# Patient Record
Sex: FEMALE | Race: WHITE | NOT HISPANIC OR LATINO | Employment: OTHER | URBAN - METROPOLITAN AREA
[De-identification: names, ages, dates, MRNs, and addresses within clinical notes are randomized per-mention and may not be internally consistent; named-entity substitution may affect disease eponyms.]

---

## 2023-02-18 ENCOUNTER — APPOINTMENT (OUTPATIENT)
Dept: GENERAL RADIOLOGY | Facility: HOSPITAL | Age: 63
End: 2023-02-18
Payer: MEDICARE

## 2023-02-18 ENCOUNTER — HOSPITAL ENCOUNTER (OUTPATIENT)
Facility: HOSPITAL | Age: 63
Setting detail: OBSERVATION
Discharge: HOME | End: 2023-02-20
Attending: EMERGENCY MEDICINE | Admitting: INTERNAL MEDICINE
Payer: MEDICARE

## 2023-02-18 DIAGNOSIS — R06.03 RESPIRATORY DISTRESS: ICD-10-CM

## 2023-02-18 DIAGNOSIS — B34.8 RHINOVIRUS INFECTION: ICD-10-CM

## 2023-02-18 DIAGNOSIS — R09.02 HYPOXIA: ICD-10-CM

## 2023-02-18 DIAGNOSIS — J45.51 SEVERE PERSISTENT ASTHMA WITH ACUTE EXACERBATION: Primary | ICD-10-CM

## 2023-02-18 PROBLEM — G25.0 ESSENTIAL TREMOR: Status: ACTIVE | Noted: 2023-02-18

## 2023-02-18 PROBLEM — F32.A ANXIETY AND DEPRESSION: Status: ACTIVE | Noted: 2023-02-18

## 2023-02-18 PROBLEM — B33.8 RSV (RESPIRATORY SYNCYTIAL VIRUS INFECTION): Status: ACTIVE | Noted: 2023-02-18

## 2023-02-18 PROBLEM — R79.89 ELEVATED TROPONIN: Status: ACTIVE | Noted: 2023-02-18

## 2023-02-18 PROBLEM — R77.8 ELEVATED TROPONIN: Status: ACTIVE | Noted: 2023-02-18

## 2023-02-18 PROBLEM — J96.01 ACUTE RESPIRATORY FAILURE WITH HYPOXIA: Status: ACTIVE | Noted: 2023-02-18

## 2023-02-18 PROBLEM — F41.9 ANXIETY AND DEPRESSION: Status: ACTIVE | Noted: 2023-02-18

## 2023-02-18 LAB
ALBUMIN SERPL-MCNC: 4.7 G/DL (ref 3.5–5.2)
ALBUMIN/GLOB SERPL: 1.7 G/DL
ALP SERPL-CCNC: 141 U/L (ref 39–117)
ALT SERPL W P-5'-P-CCNC: 21 U/L (ref 1–33)
ANION GAP SERPL CALCULATED.3IONS-SCNC: 13 MMOL/L (ref 5–15)
AST SERPL-CCNC: 17 U/L (ref 1–32)
B PARAPERT DNA SPEC QL NAA+PROBE: NOT DETECTED
B PERT DNA SPEC QL NAA+PROBE: NOT DETECTED
BASOPHILS # BLD AUTO: 0.04 10*3/MM3 (ref 0–0.2)
BASOPHILS NFR BLD AUTO: 0.4 % (ref 0–1.5)
BILIRUB SERPL-MCNC: 0.4 MG/DL (ref 0–1.2)
BUN SERPL-MCNC: 11 MG/DL (ref 8–23)
BUN/CREAT SERPL: 11.7 (ref 7–25)
C PNEUM DNA NPH QL NAA+NON-PROBE: NOT DETECTED
CALCIUM SPEC-SCNC: 9.3 MG/DL (ref 8.6–10.5)
CHLORIDE SERPL-SCNC: 101 MMOL/L (ref 98–107)
CO2 SERPL-SCNC: 23 MMOL/L (ref 22–29)
CREAT SERPL-MCNC: 0.94 MG/DL (ref 0.57–1)
D-LACTATE SERPL-SCNC: 1.3 MMOL/L (ref 0.5–2)
DEPRECATED RDW RBC AUTO: 50.6 FL (ref 37–54)
EGFRCR SERPLBLD CKD-EPI 2021: 69.6 ML/MIN/1.73
EOSINOPHIL # BLD AUTO: 0.19 10*3/MM3 (ref 0–0.4)
EOSINOPHIL NFR BLD AUTO: 1.7 % (ref 0.3–6.2)
ERYTHROCYTE [DISTWIDTH] IN BLOOD BY AUTOMATED COUNT: 14.6 % (ref 12.3–15.4)
FLUAV SUBTYP SPEC NAA+PROBE: NOT DETECTED
FLUBV RNA ISLT QL NAA+PROBE: NOT DETECTED
GEN 5 2HR TROPONIN T REFLEX: 10 NG/L
GLOBULIN UR ELPH-MCNC: 2.7 GM/DL
GLUCOSE SERPL-MCNC: 123 MG/DL (ref 65–99)
HADV DNA SPEC NAA+PROBE: NOT DETECTED
HCOV 229E RNA SPEC QL NAA+PROBE: NOT DETECTED
HCOV HKU1 RNA SPEC QL NAA+PROBE: NOT DETECTED
HCOV NL63 RNA SPEC QL NAA+PROBE: NOT DETECTED
HCOV OC43 RNA SPEC QL NAA+PROBE: NOT DETECTED
HCT VFR BLD AUTO: 41.2 % (ref 34–46.6)
HGB BLD-MCNC: 12.8 G/DL (ref 12–15.9)
HMPV RNA NPH QL NAA+NON-PROBE: NOT DETECTED
HOLD SPECIMEN: NORMAL
HPIV1 RNA ISLT QL NAA+PROBE: NOT DETECTED
HPIV2 RNA SPEC QL NAA+PROBE: NOT DETECTED
HPIV3 RNA NPH QL NAA+PROBE: NOT DETECTED
HPIV4 P GENE NPH QL NAA+PROBE: NOT DETECTED
IMM GRANULOCYTES # BLD AUTO: 0.04 10*3/MM3 (ref 0–0.05)
IMM GRANULOCYTES NFR BLD AUTO: 0.4 % (ref 0–0.5)
LYMPHOCYTES # BLD AUTO: 1.69 10*3/MM3 (ref 0.7–3.1)
LYMPHOCYTES NFR BLD AUTO: 15.3 % (ref 19.6–45.3)
M PNEUMO IGG SER IA-ACNC: NOT DETECTED
MCH RBC QN AUTO: 29.2 PG (ref 26.6–33)
MCHC RBC AUTO-ENTMCNC: 31.1 G/DL (ref 31.5–35.7)
MCV RBC AUTO: 93.8 FL (ref 79–97)
MONOCYTES # BLD AUTO: 0.86 10*3/MM3 (ref 0.1–0.9)
MONOCYTES NFR BLD AUTO: 7.8 % (ref 5–12)
NEUTROPHILS NFR BLD AUTO: 74.4 % (ref 42.7–76)
NEUTROPHILS NFR BLD AUTO: 8.23 10*3/MM3 (ref 1.7–7)
NRBC BLD AUTO-RTO: 0 /100 WBC (ref 0–0.2)
NT-PROBNP SERPL-MCNC: 114.9 PG/ML (ref 0–900)
PLATELET # BLD AUTO: 160 10*3/MM3 (ref 140–450)
PMV BLD AUTO: 12.4 FL (ref 6–12)
POTASSIUM SERPL-SCNC: 3.6 MMOL/L (ref 3.5–5.2)
PROCALCITONIN SERPL-MCNC: 0.06 NG/ML (ref 0–0.25)
PROT SERPL-MCNC: 7.4 G/DL (ref 6–8.5)
RBC # BLD AUTO: 4.39 10*6/MM3 (ref 3.77–5.28)
RHINOVIRUS RNA SPEC NAA+PROBE: DETECTED
RSV RNA NPH QL NAA+NON-PROBE: NOT DETECTED
SARS-COV-2 RNA NPH QL NAA+NON-PROBE: NOT DETECTED
SODIUM SERPL-SCNC: 137 MMOL/L (ref 136–145)
TROPONIN T DELTA: ABNORMAL
TROPONIN T SERPL HS-MCNC: 7 NG/L
TROPONIN T SERPL HS-MCNC: <6 NG/L
WBC NRBC COR # BLD: 11.05 10*3/MM3 (ref 3.4–10.8)
WHOLE BLOOD HOLD COAG: NORMAL
WHOLE BLOOD HOLD SPECIMEN: NORMAL

## 2023-02-18 PROCEDURE — 87040 BLOOD CULTURE FOR BACTERIA: CPT | Performed by: EMERGENCY MEDICINE

## 2023-02-18 PROCEDURE — 25010000002 HEPARIN (PORCINE) PER 1000 UNITS: Performed by: PHYSICIAN ASSISTANT

## 2023-02-18 PROCEDURE — 94799 UNLISTED PULMONARY SVC/PX: CPT

## 2023-02-18 PROCEDURE — 93005 ELECTROCARDIOGRAM TRACING: CPT | Performed by: EMERGENCY MEDICINE

## 2023-02-18 PROCEDURE — 84145 PROCALCITONIN (PCT): CPT | Performed by: PHYSICIAN ASSISTANT

## 2023-02-18 PROCEDURE — 0202U NFCT DS 22 TRGT SARS-COV-2: CPT | Performed by: EMERGENCY MEDICINE

## 2023-02-18 PROCEDURE — 36415 COLL VENOUS BLD VENIPUNCTURE: CPT

## 2023-02-18 PROCEDURE — 83605 ASSAY OF LACTIC ACID: CPT | Performed by: EMERGENCY MEDICINE

## 2023-02-18 PROCEDURE — 80053 COMPREHEN METABOLIC PANEL: CPT | Performed by: EMERGENCY MEDICINE

## 2023-02-18 PROCEDURE — 71045 X-RAY EXAM CHEST 1 VIEW: CPT

## 2023-02-18 PROCEDURE — 93010 ELECTROCARDIOGRAM REPORT: CPT | Performed by: INTERNAL MEDICINE

## 2023-02-18 PROCEDURE — 83880 ASSAY OF NATRIURETIC PEPTIDE: CPT | Performed by: EMERGENCY MEDICINE

## 2023-02-18 PROCEDURE — G0378 HOSPITAL OBSERVATION PER HR: HCPCS

## 2023-02-18 PROCEDURE — 94640 AIRWAY INHALATION TREATMENT: CPT

## 2023-02-18 PROCEDURE — 99285 EMERGENCY DEPT VISIT HI MDM: CPT

## 2023-02-18 PROCEDURE — 99222 1ST HOSP IP/OBS MODERATE 55: CPT | Performed by: STUDENT IN AN ORGANIZED HEALTH CARE EDUCATION/TRAINING PROGRAM

## 2023-02-18 PROCEDURE — 84484 ASSAY OF TROPONIN QUANT: CPT | Performed by: EMERGENCY MEDICINE

## 2023-02-18 PROCEDURE — 85025 COMPLETE CBC W/AUTO DIFF WBC: CPT | Performed by: EMERGENCY MEDICINE

## 2023-02-18 PROCEDURE — 96372 THER/PROPH/DIAG INJ SC/IM: CPT

## 2023-02-18 PROCEDURE — 84484 ASSAY OF TROPONIN QUANT: CPT | Performed by: PHYSICIAN ASSISTANT

## 2023-02-18 RX ORDER — MONTELUKAST SODIUM 10 MG/1
1 TABLET ORAL DAILY
COMMUNITY
Start: 2022-12-09 | End: 2023-03-08 | Stop reason: SDUPTHER

## 2023-02-18 RX ORDER — TOPIRAMATE 100 MG/1
1 TABLET, FILM COATED ORAL EVERY 12 HOURS SCHEDULED
COMMUNITY
Start: 2023-01-05

## 2023-02-18 RX ORDER — LAMOTRIGINE 100 MG/1
TABLET ORAL
COMMUNITY
Start: 2023-02-17

## 2023-02-18 RX ORDER — PROPRANOLOL HYDROCHLORIDE 10 MG/1
20 TABLET ORAL EVERY 12 HOURS SCHEDULED
Status: DISCONTINUED | OUTPATIENT
Start: 2023-02-18 | End: 2023-02-20 | Stop reason: HOSPADM

## 2023-02-18 RX ORDER — SODIUM CHLORIDE 9 MG/ML
40 INJECTION, SOLUTION INTRAVENOUS AS NEEDED
Status: DISCONTINUED | OUTPATIENT
Start: 2023-02-18 | End: 2023-02-20 | Stop reason: HOSPADM

## 2023-02-18 RX ORDER — ONDANSETRON 2 MG/ML
4 INJECTION INTRAMUSCULAR; INTRAVENOUS EVERY 6 HOURS PRN
Status: DISCONTINUED | OUTPATIENT
Start: 2023-02-18 | End: 2023-02-20 | Stop reason: HOSPADM

## 2023-02-18 RX ORDER — MONTELUKAST SODIUM 10 MG/1
10 TABLET ORAL DAILY
Status: DISCONTINUED | OUTPATIENT
Start: 2023-02-19 | End: 2023-02-20 | Stop reason: HOSPADM

## 2023-02-18 RX ORDER — PROPRANOLOL HYDROCHLORIDE 10 MG/1
2 TABLET ORAL EVERY 12 HOURS SCHEDULED
COMMUNITY
Start: 2022-12-14

## 2023-02-18 RX ORDER — FLUOXETINE HYDROCHLORIDE 20 MG/1
40 CAPSULE ORAL DAILY
Status: DISCONTINUED | OUTPATIENT
Start: 2023-02-19 | End: 2023-02-20 | Stop reason: HOSPADM

## 2023-02-18 RX ORDER — IPRATROPIUM BROMIDE AND ALBUTEROL SULFATE 2.5; .5 MG/3ML; MG/3ML
3 SOLUTION RESPIRATORY (INHALATION) ONCE
Status: COMPLETED | OUTPATIENT
Start: 2023-02-18 | End: 2023-02-18

## 2023-02-18 RX ORDER — SODIUM CHLORIDE 0.9 % (FLUSH) 0.9 %
10 SYRINGE (ML) INJECTION AS NEEDED
Status: DISCONTINUED | OUTPATIENT
Start: 2023-02-18 | End: 2023-02-20 | Stop reason: HOSPADM

## 2023-02-18 RX ORDER — FLUOXETINE HYDROCHLORIDE 40 MG/1
CAPSULE ORAL
COMMUNITY
Start: 2023-01-05

## 2023-02-18 RX ORDER — HEPARIN SODIUM 5000 [USP'U]/ML
5000 INJECTION, SOLUTION INTRAVENOUS; SUBCUTANEOUS EVERY 12 HOURS SCHEDULED
Status: DISCONTINUED | OUTPATIENT
Start: 2023-02-18 | End: 2023-02-20 | Stop reason: HOSPADM

## 2023-02-18 RX ORDER — TOPIRAMATE 100 MG/1
100 TABLET, FILM COATED ORAL EVERY 12 HOURS SCHEDULED
Status: DISCONTINUED | OUTPATIENT
Start: 2023-02-18 | End: 2023-02-20 | Stop reason: HOSPADM

## 2023-02-18 RX ORDER — IPRATROPIUM BROMIDE AND ALBUTEROL SULFATE 2.5; .5 MG/3ML; MG/3ML
3 SOLUTION RESPIRATORY (INHALATION)
Status: DISCONTINUED | OUTPATIENT
Start: 2023-02-18 | End: 2023-02-20 | Stop reason: HOSPADM

## 2023-02-18 RX ORDER — IPRATROPIUM BROMIDE AND ALBUTEROL SULFATE 2.5; .5 MG/3ML; MG/3ML
3 SOLUTION RESPIRATORY (INHALATION) EVERY 6 HOURS PRN
Status: DISCONTINUED | OUTPATIENT
Start: 2023-02-18 | End: 2023-02-20 | Stop reason: HOSPADM

## 2023-02-18 RX ORDER — FLUOXETINE HYDROCHLORIDE 20 MG/1
CAPSULE ORAL
COMMUNITY
Start: 2023-01-28

## 2023-02-18 RX ORDER — CHOLECALCIFEROL (VITAMIN D3) 125 MCG
5 CAPSULE ORAL NIGHTLY PRN
Status: DISCONTINUED | OUTPATIENT
Start: 2023-02-18 | End: 2023-02-20 | Stop reason: HOSPADM

## 2023-02-18 RX ORDER — ALBUTEROL SULFATE 2.5 MG/3ML
10 SOLUTION RESPIRATORY (INHALATION)
Status: COMPLETED | OUTPATIENT
Start: 2023-02-18 | End: 2023-02-18

## 2023-02-18 RX ORDER — ACETAMINOPHEN 325 MG/1
650 TABLET ORAL EVERY 4 HOURS PRN
Status: DISCONTINUED | OUTPATIENT
Start: 2023-02-18 | End: 2023-02-20 | Stop reason: HOSPADM

## 2023-02-18 RX ORDER — OLANZAPINE 20 MG/1
1 TABLET ORAL DAILY
COMMUNITY
Start: 2022-12-10

## 2023-02-18 RX ORDER — SODIUM CHLORIDE 0.9 % (FLUSH) 0.9 %
10 SYRINGE (ML) INJECTION EVERY 12 HOURS SCHEDULED
Status: DISCONTINUED | OUTPATIENT
Start: 2023-02-18 | End: 2023-02-20 | Stop reason: HOSPADM

## 2023-02-18 RX ORDER — OLANZAPINE 5 MG/1
20 TABLET ORAL DAILY
Status: DISCONTINUED | OUTPATIENT
Start: 2023-02-19 | End: 2023-02-20 | Stop reason: HOSPADM

## 2023-02-18 RX ORDER — FLUOXETINE HYDROCHLORIDE 20 MG/1
20 CAPSULE ORAL DAILY
Status: DISCONTINUED | OUTPATIENT
Start: 2023-02-19 | End: 2023-02-20 | Stop reason: HOSPADM

## 2023-02-18 RX ADMIN — PROPRANOLOL HYDROCHLORIDE 20 MG: 10 TABLET ORAL at 22:07

## 2023-02-18 RX ADMIN — IPRATROPIUM BROMIDE AND ALBUTEROL SULFATE 3 ML: 2.5; .5 SOLUTION RESPIRATORY (INHALATION) at 16:43

## 2023-02-18 RX ADMIN — ACETAMINOPHEN 325MG 650 MG: 325 TABLET ORAL at 22:07

## 2023-02-18 RX ADMIN — TOPIRAMATE 100 MG: 100 TABLET, FILM COATED ORAL at 22:08

## 2023-02-18 RX ADMIN — ALBUTEROL SULFATE 10 MG: 2.5 SOLUTION RESPIRATORY (INHALATION) at 16:42

## 2023-02-18 RX ADMIN — Medication 10 ML: at 22:08

## 2023-02-18 RX ADMIN — HEPARIN SODIUM 5000 UNITS: 5000 INJECTION INTRAVENOUS; SUBCUTANEOUS at 22:08

## 2023-02-18 RX ADMIN — DOXYCYCLINE 100 MG: 100 INJECTION, POWDER, LYOPHILIZED, FOR SOLUTION INTRAVENOUS at 21:49

## 2023-02-18 RX ADMIN — IPRATROPIUM BROMIDE AND ALBUTEROL SULFATE 3 ML: 2.5; .5 SOLUTION RESPIRATORY (INHALATION) at 21:54

## 2023-02-18 NOTE — ED PROVIDER NOTES
Subjective   History of Present Illness  62-year-old female who presents for evaluation of shortness of breath.  The patient reports that she had a potential sick contact in the form of her granddaughter on Monday, 6 days ago.  Her granddaughter was diagnosed with strep pharyngitis.  Per the family's report the granddaughter self never really appeared that ill.  The patient herself does have a longstanding history of asthma.  She does not typically wear oxygen at home.  She does intermittently have to use breathing treatments and steroids at home.  She reports that secondary to steroid use she has got agoraphobia, but she still has to use it on a regular basis and essentially take Zyprexa in combination with the steroids to help minimize agoraphobia.  She reports that midweek, on Wednesday roughly 4 days ago she began to have increasing shortness of breath.  It was dramatically worse this morning.  The patient was identified to be hypoxic and tachycardic upon initial presentation.  With the application of oxygen the hypoxia has been corrected.  The patient is awake and alert with normal mentation.  She does report wheezing and external audible wheezing is heard from across the room.  She does report a mild cough that is mildly productive.  No reported fever.  She does report some chest tightness but no pain.  No abdominal pain.  No reported change in bowel or urinary function.  No other acute complaints.  No acute abdomen no does viewed, ET tube in atrial food seen by one of the septal 1 please get this warm doctor for your        Review of Systems   Constitutional: Positive for activity change, appetite change and fatigue. Negative for chills and fever.   HENT: Negative for congestion, ear pain, postnasal drip, sinus pressure and sore throat.    Eyes: Negative for pain, redness and visual disturbance.   Respiratory: Positive for cough and shortness of breath. Negative for chest tightness.    Cardiovascular:  Negative for chest pain, palpitations and leg swelling.   Gastrointestinal: Negative for abdominal pain, anal bleeding, blood in stool, diarrhea, nausea and vomiting.   Endocrine: Negative for polydipsia and polyuria.   Genitourinary: Negative for difficulty urinating, dysuria, frequency and urgency.   Musculoskeletal: Negative for arthralgias, back pain and neck pain.   Skin: Negative for pallor and rash.   Allergic/Immunologic: Negative for environmental allergies and immunocompromised state.   Neurological: Negative for dizziness, weakness and headaches.   Hematological: Negative for adenopathy.   Psychiatric/Behavioral: Negative for confusion, self-injury and suicidal ideas. The patient is nervous/anxious.    All other systems reviewed and are negative.      Past Medical History:   Diagnosis Date   • Asthma    • Mood disorder (HCC)        Allergies   Allergen Reactions   • Prednisone Delirium     PT STATED STEROID PSYCHOSIS       Past Surgical History:   Procedure Laterality Date   • CHOLECYSTECTOMY     • HYSTERECTOMY         History reviewed. No pertinent family history.    Social History     Socioeconomic History   • Marital status:    Tobacco Use   • Smoking status: Never     Passive exposure: Never   • Smokeless tobacco: Never   Vaping Use   • Vaping Use: Never used   Substance and Sexual Activity   • Alcohol use: Never   • Drug use: Never   • Sexual activity: Defer           Objective   Physical Exam  Vitals and nursing note reviewed.   Constitutional:       General: She is not in acute distress.     Appearance: Normal appearance. She is well-developed. She is not toxic-appearing or diaphoretic.   HENT:      Head: Normocephalic and atraumatic.      Right Ear: External ear normal.      Left Ear: External ear normal.      Nose: Nose normal.   Eyes:      General: Lids are normal.      Pupils: Pupils are equal, round, and reactive to light.   Neck:      Trachea: No tracheal deviation.   Cardiovascular:       Rate and Rhythm: Regular rhythm. Tachycardia present.      Pulses: No decreased pulses.      Heart sounds: Normal heart sounds. No murmur heard.    No friction rub. No gallop.   Pulmonary:      Effort: Tachypnea and accessory muscle usage present. No respiratory distress.      Breath sounds: Examination of the right-upper field reveals wheezing. Examination of the left-upper field reveals wheezing. Examination of the right-middle field reveals wheezing. Examination of the left-middle field reveals wheezing. Examination of the right-lower field reveals wheezing. Examination of the left-lower field reveals wheezing. Wheezing present. No decreased breath sounds, rhonchi or rales.   Abdominal:      General: Bowel sounds are normal.      Palpations: Abdomen is soft.      Tenderness: There is no abdominal tenderness. There is no guarding or rebound.   Musculoskeletal:         General: No deformity. Normal range of motion.      Cervical back: Normal range of motion and neck supple.   Lymphadenopathy:      Cervical: No cervical adenopathy.   Skin:     General: Skin is warm and dry.      Findings: No rash.   Neurological:      Mental Status: She is alert and oriented to person, place, and time.      Cranial Nerves: No cranial nerve deficit.      Sensory: No sensory deficit.   Psychiatric:         Speech: Speech normal.         Behavior: Behavior normal.         Thought Content: Thought content normal.         Judgment: Judgment normal.         Procedures           ED Course                                           Medical Decision Making  Differential diagnosis includes acute pneumonia, acute viral illness, acute asthma exacerbation, COPD exacerbation, CHF exacerbation.    The patient has diffuse wheezing on auscultation of the lungs.  His auscultation can be heard from across the room upon entering the room.    The patient is tachycardic tachypneic, and hypoxic upon initial evaluation.  She does not typically wear  oxygen.    Chest x-ray do not show an acute infection process.    Viral panel was positive for human rhinovirus.    Evaluation is really nonrevealing.    Patient was given a breathing treatment, did appear better, but continued to work hard to breathe.    I discussed the patient with the hospitalist.  The hospital service will consult on the patient to determine status of admission.        Hypoxia: acute illness or injury  Respiratory distress: acute illness or injury  Rhinovirus infection: acute illness or injury  Severe persistent asthma with acute exacerbation: acute illness or injury  Amount and/or Complexity of Data Reviewed  Labs: ordered.  Radiology: ordered.  ECG/medicine tests: ordered.      Risk  Prescription drug management.  Decision regarding hospitalization.          Final diagnoses:   Severe persistent asthma with acute exacerbation   Hypoxia   Respiratory distress   Rhinovirus infection       ED Disposition  ED Disposition     ED Disposition   Decision to Admit    Condition   --    Comment   Level of Care: Telemetry [5]   Diagnosis: Severe persistent asthma with acute exacerbation [640185]               Tiffany Kelley, APRN  [address] [address]  [phone]    Follow up  Call to schedule a follow-up appointment for 1 week.         Medication List      New Prescriptions    budesonide 0.5 MG/2ML nebulizer solution  Commonly known as: PULMICORT  Inhale 2 mL by nebulization 2 (Two) Times a Day for 5 days.     budesonide-formoterol 160-4.5 MCG/ACT inhaler  Commonly known as: Symbicort  Inhale 2 puffs 2 (Two) Times a Day.     doxycycline 100 MG capsule  Commonly known as: VIBRAMYCIN  Take 1 capsule by mouth 2 (Two) Times a Day.     predniSONE 10 MG tablet  Commonly known as: DELTASONE  Take 4 tablets by mouth Daily for 2 days, THEN 3 tablets Daily for 2 days, THEN 2 tablets Daily for 2 days, THEN 1 tablet Daily for 2 days.  Start taking on: February 20, 2023            Where to Get Your Medications      These medications were sent to [facility] - [address] [place] [address]    Hours: 7:00 AM-5:30 PM M-F, 8:00 AM-4:30 PM Sat-Sun Phone: [phone]   · budesonide 0.5 MG/2ML nebulizer solution  · doxycycline 100 MG capsule  · predniSONE 10 MG tablet     Information about where to get these medications is not yet available    Ask your nurse or doctor about these medications  · budesonide-formoterol 160-4.5 MCG/ACT inhaler          Rickey Mackenzie MD  02/21/23 2986

## 2023-02-19 LAB
ANION GAP SERPL CALCULATED.3IONS-SCNC: 10 MMOL/L (ref 5–15)
BUN SERPL-MCNC: 13 MG/DL (ref 8–23)
BUN/CREAT SERPL: 13.5 (ref 7–25)
CALCIUM SPEC-SCNC: 8.9 MG/DL (ref 8.6–10.5)
CHLORIDE SERPL-SCNC: 105 MMOL/L (ref 98–107)
CO2 SERPL-SCNC: 25 MMOL/L (ref 22–29)
CREAT SERPL-MCNC: 0.96 MG/DL (ref 0.57–1)
DEPRECATED RDW RBC AUTO: 51.1 FL (ref 37–54)
EGFRCR SERPLBLD CKD-EPI 2021: 67.9 ML/MIN/1.73
ERYTHROCYTE [DISTWIDTH] IN BLOOD BY AUTOMATED COUNT: 14.7 % (ref 12.3–15.4)
GLUCOSE SERPL-MCNC: 104 MG/DL (ref 65–99)
HBA1C MFR BLD: 5.1 % (ref 4.8–5.6)
HCT VFR BLD AUTO: 38.3 % (ref 34–46.6)
HGB BLD-MCNC: 12 G/DL (ref 12–15.9)
MCH RBC QN AUTO: 29.7 PG (ref 26.6–33)
MCHC RBC AUTO-ENTMCNC: 31.3 G/DL (ref 31.5–35.7)
MCV RBC AUTO: 94.8 FL (ref 79–97)
PLATELET # BLD AUTO: 133 10*3/MM3 (ref 140–450)
PMV BLD AUTO: 13.2 FL (ref 6–12)
POTASSIUM SERPL-SCNC: 3.7 MMOL/L (ref 3.5–5.2)
QT INTERVAL: 360 MS
QTC INTERVAL: 469 MS
RBC # BLD AUTO: 4.04 10*6/MM3 (ref 3.77–5.28)
SODIUM SERPL-SCNC: 140 MMOL/L (ref 136–145)
WBC NRBC COR # BLD: 6.05 10*3/MM3 (ref 3.4–10.8)

## 2023-02-19 PROCEDURE — G0378 HOSPITAL OBSERVATION PER HR: HCPCS

## 2023-02-19 PROCEDURE — 94799 UNLISTED PULMONARY SVC/PX: CPT

## 2023-02-19 PROCEDURE — 99232 SBSQ HOSP IP/OBS MODERATE 35: CPT | Performed by: INTERNAL MEDICINE

## 2023-02-19 PROCEDURE — 85027 COMPLETE CBC AUTOMATED: CPT | Performed by: PHYSICIAN ASSISTANT

## 2023-02-19 PROCEDURE — 63710000001 PREDNISONE PER 1 MG: Performed by: INTERNAL MEDICINE

## 2023-02-19 PROCEDURE — 94664 DEMO&/EVAL PT USE INHALER: CPT

## 2023-02-19 PROCEDURE — 83036 HEMOGLOBIN GLYCOSYLATED A1C: CPT | Performed by: PHYSICIAN ASSISTANT

## 2023-02-19 PROCEDURE — 96372 THER/PROPH/DIAG INJ SC/IM: CPT

## 2023-02-19 PROCEDURE — 25010000002 HEPARIN (PORCINE) PER 1000 UNITS: Performed by: PHYSICIAN ASSISTANT

## 2023-02-19 PROCEDURE — 80048 BASIC METABOLIC PNL TOTAL CA: CPT | Performed by: PHYSICIAN ASSISTANT

## 2023-02-19 RX ORDER — PREDNISONE 20 MG/1
40 TABLET ORAL
Status: DISCONTINUED | OUTPATIENT
Start: 2023-02-19 | End: 2023-02-20 | Stop reason: HOSPADM

## 2023-02-19 RX ORDER — BUDESONIDE 0.5 MG/2ML
0.5 INHALANT ORAL
Status: DISCONTINUED | OUTPATIENT
Start: 2023-02-19 | End: 2023-02-20 | Stop reason: HOSPADM

## 2023-02-19 RX ORDER — LAMOTRIGINE 100 MG/1
100 TABLET ORAL DAILY
Status: DISCONTINUED | OUTPATIENT
Start: 2023-02-19 | End: 2023-02-20 | Stop reason: HOSPADM

## 2023-02-19 RX ADMIN — HEPARIN SODIUM 5000 UNITS: 5000 INJECTION INTRAVENOUS; SUBCUTANEOUS at 08:15

## 2023-02-19 RX ADMIN — IPRATROPIUM BROMIDE AND ALBUTEROL SULFATE 3 ML: 2.5; .5 SOLUTION RESPIRATORY (INHALATION) at 20:02

## 2023-02-19 RX ADMIN — BUDESONIDE 0.5 MG: 0.5 SUSPENSION RESPIRATORY (INHALATION) at 20:02

## 2023-02-19 RX ADMIN — OLANZAPINE 20 MG: 5 TABLET, FILM COATED ORAL at 08:13

## 2023-02-19 RX ADMIN — PREDNISONE 40 MG: 20 TABLET ORAL at 12:26

## 2023-02-19 RX ADMIN — Medication 10 ML: at 21:18

## 2023-02-19 RX ADMIN — BUDESONIDE 0.5 MG: 0.5 SUSPENSION RESPIRATORY (INHALATION) at 10:49

## 2023-02-19 RX ADMIN — PROPRANOLOL HYDROCHLORIDE 20 MG: 10 TABLET ORAL at 21:15

## 2023-02-19 RX ADMIN — PROPRANOLOL HYDROCHLORIDE 20 MG: 10 TABLET ORAL at 08:14

## 2023-02-19 RX ADMIN — DOXYCYCLINE 100 MG: 100 INJECTION, POWDER, LYOPHILIZED, FOR SOLUTION INTRAVENOUS at 21:14

## 2023-02-19 RX ADMIN — IPRATROPIUM BROMIDE AND ALBUTEROL SULFATE 3 ML: 2.5; .5 SOLUTION RESPIRATORY (INHALATION) at 07:26

## 2023-02-19 RX ADMIN — Medication 10 ML: at 08:22

## 2023-02-19 RX ADMIN — MONTELUKAST 10 MG: 10 TABLET, FILM COATED ORAL at 08:14

## 2023-02-19 RX ADMIN — DOXYCYCLINE 100 MG: 100 INJECTION, POWDER, LYOPHILIZED, FOR SOLUTION INTRAVENOUS at 08:15

## 2023-02-19 RX ADMIN — HEPARIN SODIUM 5000 UNITS: 5000 INJECTION INTRAVENOUS; SUBCUTANEOUS at 21:15

## 2023-02-19 RX ADMIN — FLUOXETINE 20 MG: 20 CAPSULE ORAL at 08:16

## 2023-02-19 RX ADMIN — FLUOXETINE 40 MG: 20 CAPSULE ORAL at 08:12

## 2023-02-19 RX ADMIN — Medication 5 MG: at 21:15

## 2023-02-19 RX ADMIN — LAMOTRIGINE 100 MG: 100 TABLET ORAL at 08:14

## 2023-02-19 RX ADMIN — TOPIRAMATE 100 MG: 100 TABLET, FILM COATED ORAL at 08:14

## 2023-02-19 RX ADMIN — IPRATROPIUM BROMIDE AND ALBUTEROL SULFATE 3 ML: 2.5; .5 SOLUTION RESPIRATORY (INHALATION) at 10:49

## 2023-02-19 RX ADMIN — TOPIRAMATE 100 MG: 100 TABLET, FILM COATED ORAL at 21:15

## 2023-02-19 RX ADMIN — ACETAMINOPHEN 325MG 650 MG: 325 TABLET ORAL at 21:22

## 2023-02-19 RX ADMIN — ACETAMINOPHEN 325MG 650 MG: 325 TABLET ORAL at 08:13

## 2023-02-19 RX ADMIN — IPRATROPIUM BROMIDE AND ALBUTEROL SULFATE 3 ML: 2.5; .5 SOLUTION RESPIRATORY (INHALATION) at 15:56

## 2023-02-19 NOTE — PLAN OF CARE
Goal Outcome Evaluation:  Plan of Care Reviewed With: patient        Progress: improving  Outcome Evaluation: O2 @ 2L NC, no complaints of SOA, has rested well. Ambulated to . S

## 2023-02-19 NOTE — PLAN OF CARE
Goal Outcome Evaluation:  Plan of Care Reviewed With: patient        Progress: improving  Outcome Evaluation: Patient states she feels much better than when admitted. Continues to wheeze. Prednisone atarted today. Continue to try and wean O2.

## 2023-02-19 NOTE — PROGRESS NOTES
[facility]  PROGRESS NOTE    Patient Name: Katheryn Garcia  : 1960  MRN: [id number]    Date of Admission: 2023  Primary Care Physician: Provider, No Known    Subjective   Subjective     CC:  Asthma exacerbation    HPI:  Still wheezing and feels short of breath at times.  Coughing, minimally productive.    ROS:  Gen: no fevers  Pulm: wheezing, cough    Objective   Objective     Vital Signs:   Temp:  [98 °F (36.7 °C)-99.1 °F (37.3 °C)] 98.1 °F (36.7 °C)  Heart Rate:  [] 72  Resp:  [16-30] 16  BP: (112-160)/() 132/56  Flow (L/min):  [2] 2     Physical Exam:  Constitutional - no acute distress, nontoxic, in bed  HEENT-NCAT, mucous membranes moist  CV-RRR  Resp-diffuse bilateral expiratory wheezes  Abd-soft, nontender, nondistended, normoactive bowel sounds, overweight  Ext-No lower extremity cyanosis, clubbing or edema bilaterally  Neuro-alert and oriented, speech clear, moves all extremities   Psych-normal affect   Skin- No rash on exposed UE or LE bilaterally      Results Reviewed:  LAB RESULTS:      Lab 23  0550 23  1848 23  1400   WBC 6.05  --  11.05*   HEMOGLOBIN 12.0  --  12.8   HEMATOCRIT 38.3  --  41.2   PLATELETS 133*  --  160   NEUTROS ABS  --   --  8.23*   IMMATURE GRANS (ABS)  --   --  0.04   LYMPHS ABS  --   --  1.69   MONOS ABS  --   --  0.86   EOS ABS  --   --  0.19   MCV 94.8  --  93.8   PROCALCITONIN  --  0.06  --    LACTATE  --   --  1.3         Lab 23  0550 23  1400   SODIUM 140 137   POTASSIUM 3.7 3.6   CHLORIDE 105 101   CO2 25.0 23.0   ANION GAP 10.0 13.0   BUN 13 11   CREATININE 0.96 0.94   EGFR 67.9 69.6   GLUCOSE 104* 123*   CALCIUM 8.9 9.3   HEMOGLOBIN A1C 5.10  --          Lab 23  1400   TOTAL PROTEIN 7.4   ALBUMIN 4.7   GLOBULIN 2.7   ALT (SGPT) 21   AST (SGOT) 17   BILIRUBIN 0.4   ALK PHOS 141*         Lab 23  2112 23  1848 23  1400   PROBNP  --   --  114.9   HSTROP T 7  10* <6                 Brief Urine Lab Results     None          Microbiology Results Abnormal     None          XR Chest 1 View    Result Date: 2/18/2023  XR CHEST 1 VW Date of Exam: 2/18/2023 1:55 PM EST Indication: SOA triage protocol. Comparison: None available. Findings: Normal cardiomediastinal silhouette. The lungs are clear. No pleural effusion or pneumothorax. No acute osseous findings.     Impression: Impression: No acute cardiopulmonary findings.  Electronically Signed: Nick Melgar  2/18/2023 2:37 PM EST  Workstation ID: [id number]          I have reviewed the medications:  Scheduled Meds:budesonide, 0.5 mg, Nebulization, BID - RT  doxycycline, 100 mg, Intravenous, Q12H  FLUoxetine, 20 mg, Oral, Daily  FLUoxetine, 40 mg, Oral, Daily  heparin (porcine), 5,000 Units, Subcutaneous, Q12H  ipratropium-albuterol, 3 mL, Nebulization, 4x Daily - RT  lamoTRIgine, 100 mg, Oral, Daily  montelukast, 10 mg, Oral, Daily  OLANZapine, 20 mg, Oral, Daily  predniSONE, 40 mg, Oral, Daily With Breakfast  propranolol, 20 mg, Oral, Q12H  sodium chloride, 10 mL, Intravenous, Q12H  topiramate, 100 mg, Oral, Q12H      Continuous Infusions:   PRN Meds:.•  acetaminophen  •  ipratropium-albuterol  •  melatonin  •  ondansetron  •  sodium chloride  •  sodium chloride  •  sodium chloride    Assessment & Plan   Assessment & Plan     Active Hospital Problems    Diagnosis  POA   • **Acute respiratory failure with hypoxia (HCC) [J96.01]  Yes   • Severe persistent asthma with acute exacerbation [J45.51]  Yes   • Rhinovirus infection [B34.8]  Yes   • Elevated troponin [R77.8]  Yes   • Anxiety and depression [F41.9, F32.A]  Yes   • Essential tremor [G25.0]  Yes      Resolved Hospital Problems   No resolved problems to display.        Brief Hospital Course to date:  Katheryn Garcia is a 62 y.o. female with history of severe persistent asthma, essential tremor, anxiety and depression presents with SOA, cough, congestion and wheezing    Asthma  exacerbation  Rhinovirus infection  - patient with continued wheezing -- agreeable to steroid therapy (states that she has taken two courses of prednisone in the past 6 months for asthma flares, usually needed concurrent olanzapine to help with anxiety/psychosis caused by the steroids.  Doesn't think this will be an issue now, since she is now on olanzapine daily.  - prednisone 40 mg PO daily with taper  - add budesonide nebs BID  - continue scheduled duonebs  - continue empiric doxycycline x 5 days  - wean oxygen as tolerated, currently 98-99% on 3 L  - patient somewhat non-compliant with home symbicort -- discussed that for this medication to be helpful, it needs to be taken on a regular basis  - will refer to pulmonology at discharge to establish care, obtain PFTs and consideration of sleep study    Obesity  - BMI 42  - would consider sleep study to evaluate for JAY    Anxiety/Depression  - home meds continued    Essential tremor    Expected Discharge Location and Transportation: home  Expected Discharge        DVT prophylaxis:  Medical DVT prophylaxis orders are present.     AM-PAC 6 Clicks Score (PT): 24 (02/18/23 1940)    CODE STATUS:   Code Status and Medical Interventions:   Ordered at: 02/18/23 1953     Level Of Support Discussed With:    Patient     Code Status (Patient has no pulse and is not breathing):    CPR (Attempt to Resuscitate)     Medical Interventions (Patient has pulse or is breathing):    Full Support       Adama Peraza MD  02/19/23

## 2023-02-19 NOTE — H&P
"    [facility] Medicine Services  HISTORY AND PHYSICAL    Patient Name: Katheryn Garcia  : 1960  MRN: [id number]  Primary Care Physician: Provider, No Known  Date of admission: 2023    Subjective   Subjective     Chief Complaint:  SOB    HPI:  Katheryn Garcia is a 62 y.o. female with a past medical history significant for severe persistent asthma, essential tremor, and anxiety/depression. She presents today with complaints of progressively worsening SOB going on for the past week. Dyspnea worse with exertion. There is associated productive cough, congestion, subjective fever, and malaise/generalized weakness. Patient also reporting a GI bug 10 days ago during which time she had some abdominal pain, nausea without vomiting and diarrhea. States she and her  babysit their grandchildren often. About 10 days ago one of the children was sick with strep throat. Suspects this as the exposure source of exacerbation. Patient was concerned with her breathing and oxygenation today. Breathing treatments and inhalers have not helped. Subsequently went to urgent care where oxygen saturation was low in the high 80's. She was directed to ED for further evaluation and treatment.  Currently there are no complaints of chest pain, lower extremity pain/swelling, or syncope. No headache or focal weakness/parathesias. No history of DVT/PE, CHF, or COPD. Denies history of tobacco abuse or second hand smoke exposure. She adds that she was last treated for an asthma exacerbation about 4 weeks ago wherein she completed a round of PO steroids. States she has been on \"too many steroids\" and now has \"lingering\" steroid induced psychosis and weight gain. She was recently started Zyprexa and Topamax for this and wishes to avoid steroid use altogether if possible. She is not on supplemental oxygen and not currently followed by pulmonology. States her last pulmonologist passed away years ago and she has not " seen a new one in years. Is agreeable to establish care. No other complaints at this time. Will admit to inpatient.      Review of Systems   Constitutional: Positive for chills, fatigue and fever.   HENT: Positive for congestion, postnasal drip and rhinorrhea. Negative for sore throat and trouble swallowing.    Eyes: Negative for photophobia and visual disturbance.   Respiratory: Positive for cough and shortness of breath.    Cardiovascular: Negative for chest pain and leg swelling.   Gastrointestinal: Negative for abdominal distention, abdominal pain, diarrhea, nausea and vomiting.   Endocrine: Negative for cold intolerance and heat intolerance.   Genitourinary: Negative for dysuria and flank pain.   Musculoskeletal: Negative for back pain and gait problem.   Skin: Negative for pallor and rash.   Allergic/Immunologic: Positive for immunocompromised state.   Neurological: Positive for weakness. Negative for dizziness and headaches.   Hematological: Negative for adenopathy.   Psychiatric/Behavioral: Negative for agitation and confusion.        All other systems reviewed and are negative.     Personal History     Past Medical History:   Diagnosis Date   • Asthma    • Mood disorder (HCC)              Past Surgical History:   Procedure Laterality Date   • CHOLECYSTECTOMY     • HYSTERECTOMY         Family History:  family history is not on file. Otherwise pertinent FHx was reviewed and unremarkable.     Social History:  reports that she has never smoked. She does not have any smokeless tobacco history on file. Alcohol use questions deferred to the physician. She reports that she does not use drugs.  Social History     Social History Narrative   • Not on file       Medications:  FLUoxetine, OLANZapine, lamoTRIgine, montelukast, propranolol, and topiramate    Allergies   Allergen Reactions   • Prednisone Delirium     PT STATED STEROID PSYCHOSIS       Objective   Objective     Vital Signs:   Temp:  [98.5 °F (36.9 °C)] 98.5  °F (36.9 °C)  Heart Rate:  [] 102  Resp:  [30] 30  BP: (112-160)/() 112/66  Flow (L/min):  [2] 2    Physical Exam   Constitutional: Awake, alert  Eyes: PERRLA, sclerae anicteric, no conjunctival injection  HENT: NCAT, mucous membranes moist  Neck: Supple, no thyromegaly, no lymphadenopathy, trachea midline  Respiratory: expiratory wheezes bilaterally nonlabored respirations   Cardiovascular: RRR, no murmurs, rubs, or gallops, palpable pedal pulses bilaterally  Gastrointestinal: Positive bowel sounds, soft, nontender, nondistended  Musculoskeletal: No bilateral ankle edema, no clubbing or cyanosis to extremities  Psychiatric: Appropriate affect, cooperative  Neurologic: Oriented x 3, strength symmetric in all extremities, Cranial Nerves grossly intact to confrontation, speech clear  Skin: No rashes      Result Review:  I have personally reviewed the results from the time of this admission to 2/18/2023 20:13 EST and agree with these findings:  [x]  Laboratory list / accordion  []  Microbiology  []  Radiology  []  EKG/Telemetry   []  Cardiology/Vascular   []  Pathology  [x]  Old records  []  Other:  Most notable findings include: . RR 30. Hypoxic to 87% on RA. Glucose 123. Alk phos 141. WBC 11. Chemistry and hematology otherwise favorable. Shows RSV. CXR clear.    LAB RESULTS:      Lab 02/18/23  1848 02/18/23  1400   WBC  --  11.05*   HEMOGLOBIN  --  12.8   HEMATOCRIT  --  41.2   PLATELETS  --  160   NEUTROS ABS  --  8.23*   IMMATURE GRANS (ABS)  --  0.04   LYMPHS ABS  --  1.69   MONOS ABS  --  0.86   EOS ABS  --  0.19   MCV  --  93.8   PROCALCITONIN 0.06  --    LACTATE  --  1.3         Lab 02/18/23  1400   SODIUM 137   POTASSIUM 3.6   CHLORIDE 101   CO2 23.0   ANION GAP 13.0   BUN 11   CREATININE 0.94   EGFR 69.6   GLUCOSE 123*   CALCIUM 9.3         Lab 02/18/23  1400   TOTAL PROTEIN 7.4   ALBUMIN 4.7   GLOBULIN 2.7   ALT (SGPT) 21   AST (SGOT) 17   BILIRUBIN 0.4   ALK PHOS 141*         Lab  02/18/23  1848 02/18/23  1400   PROBNP  --  114.9   HSTROP T 10* <6                 Brief Urine Lab Results     None        Microbiology Results (last 10 days)     Procedure Component Value - Date/Time    COVID PRE-OP / PRE-PROCEDURE SCREENING ORDER (NO ISOLATION) - Swab, Nasopharynx [[id number]]  (Abnormal) Collected: 02/18/23 1350    Lab Status: Final result Specimen: Swab from Nasopharynx Updated: 02/18/23 1528    Narrative:      The following orders were created for panel order COVID PRE-OP / PRE-PROCEDURE SCREENING ORDER (NO ISOLATION) - Swab, Nasopharynx.  Procedure                               Abnormality         Status                     ---------                               -----------         ------                     Respiratory Panel PCR w/...[[id number]]  Abnormal            Final result                 Please view results for these tests on the individual orders.    Respiratory Panel PCR w/COVID-19(SARS-CoV-2) LEROY/VISHNU/RUPERT/PAD/COR/MAD/SHELDON In-House, NP Swab in UTM/VTM, 3-4 HR TAT - Swab, Nasopharynx [[id number]]  (Abnormal) Collected: 02/18/23 1350    Lab Status: Final result Specimen: Swab from Nasopharynx Updated: 02/18/23 1528     ADENOVIRUS, PCR Not Detected     Coronavirus 229E Not Detected     Coronavirus HKU1 Not Detected     Coronavirus NL63 Not Detected     Coronavirus OC43 Not Detected     COVID19 Not Detected     Human Metapneumovirus Not Detected     Human Rhinovirus/Enterovirus Detected     Influenza A PCR Not Detected     Influenza B PCR Not Detected     Parainfluenza Virus 1 Not Detected     Parainfluenza Virus 2 Not Detected     Parainfluenza Virus 3 Not Detected     Parainfluenza Virus 4 Not Detected     RSV, PCR Not Detected     Bordetella pertussis pcr Not Detected     Bordetella parapertussis PCR Not Detected     Chlamydophila pneumoniae PCR Not Detected     Mycoplasma pneumo by PCR Not Detected    Narrative:      In the setting of a positive respiratory panel with a viral infection  PLUS a negative procalcitonin without other underlying concern for bacterial infection, consider observing off antibiotics or discontinuation of antibiotics and continue supportive care. If the respiratory panel is positive for atypical bacterial infection (Bordetella pertussis, Chlamydophila pneumoniae, or Mycoplasma pneumoniae), consider antibiotic de-escalation to target atypical bacterial infection.          XR Chest 1 View    Result Date: 2/18/2023  XR CHEST 1 VW Date of Exam: 2/18/2023 1:55 PM EST Indication: SOA triage protocol. Comparison: None available. Findings: Normal cardiomediastinal silhouette. The lungs are clear. No pleural effusion or pneumothorax. No acute osseous findings.     Impression: Impression: No acute cardiopulmonary findings.  Electronically Signed: Nick Talia  2/18/2023 2:37 PM EST  Workstation ID: [id number]          Assessment & Plan   Assessment & Plan       Acute respiratory failure with hypoxia (HCC)    Elevated troponin    Severe persistent asthma with acute exacerbation    RSV (respiratory syncytial virus infection)    Anxiety and depression    Essential tremor      62 y.o. female with a past medical history significant for severe persistent asthma, essential tremor, and anxiety/depression. She. Here with progressive SOB, cough, congestion. RPR shows RSV. Admitted for asthma exacerbation.    Acute Respiratory Failure with Hypoxia  Acute Exacerbation of Asthma  Rhinovirus  - CXR clear. CT, ABG as needed as needed  - isolation precautions as needed  - scheduled duo nebs  - Symbicort  - Montelukast  - doxycycline 100 mg BID  - check lactic acid, procalcitonin  - holding on steroids for now  - consult to pulmonology appreciate input  - continuous pulse ox, maintain > 92%  - close monitor on telemetry  - am labs    Elevated Troponin  - no acute EKG changes. Continue to trend  - no history of CAD    Anxiety/depression  Steroid Induced Psychosis  Essential Tremor  - Cymbalta, Topamax,  Propranolol, Zyprexa    DVT prophylaxis:  LEVON    CODE STATUS:  Full Code  Level Of Support Discussed With: Patient  Code Status (Patient has no pulse and is not breathing): CPR (Attempt to Resuscitate)  Medical Interventions (Patient has pulse or is breathing): Full Support      Expected Discharge  TBD    This note has been completed as part of a split-shared workflow.     Electronically signed by Omayra Cochran PA-C, 02/18/23, 8:12 PM EST.    More than 50% of time spent counseling on current illness and plan of care. Case discussed with: Dr. Santiago  Total time spent face to face with the patient was 25 minutes.  Total time of the encounter was 55 minutes.        Attending   Admission Attestation       I have performed an independent face-to-face diagnostic evaluation including performing an independent physical examination as documented here.  The documented plan of care above was reviewed and developed with the advanced practice clinician (APC).      Brief Summary Statement:   Katheryn Garcia is a 62 y.o. female with a history of asthma who is presenting with several days of cough congestion and worsening shortness of breath.  She has been exposed to her grandkids who have had strep infection.  She is to follow-up with pulmonology but has not followed with them for years.  She previously has had some adverse reactions to steroids so would prefer to avoid them.  She is having a little bit relief with the breathing treatments she has been receiving here    Remainder of detailed HPI is as noted by APC and has been reviewed and/or edited by me for completeness.    Attending Physical Exam:  Temp:  [98.5 °F (36.9 °C)-99.1 °F (37.3 °C)] 99.1 °F (37.3 °C)  Heart Rate:  [] 101  Resp:  [16-30] 16  BP: (112-160)/() 123/79  Flow (L/min):  [2] 2    Constitutional: No acute distress, awake, alert  HENT: NCAT, mucous membranes moist  Respiratory: Bilateral wheezing and rhonchi, frequent coughing, at rest appears  comfortable on 3 L nasal cannula  Cardiovascular: RRR, no murmurs, rubs, or gallops  Gastrointestinal: Positive bowel sounds, soft, nontender, nondistended  Musculoskeletal: No bilateral ankle edema  Psychiatric: Appropriate affect, cooperative  Neurologic: Oriented x 3, strength symmetric in all extremities, Cranial Nerves grossly intact to confrontation, speech clear  Skin: No rashes      Brief Assessment/Plan :  See detailed assessment and plan developed with APC which I have reviewed and/or edited for completeness.    In summary 62-year-old female with asthma admitted with an asthma exacerbation.  Pulmonology to see in kala Santiago MD  02/18/23

## 2023-02-19 NOTE — PROGRESS NOTES
Discussed need for consult with Dr. Peraza.  Doing reasonably well at present, so patient will be referred to the office for an initial evaluation and long-term follow-up with PFTs etc.  Therefore we will hold off on inpatient consult    Electronically signed by Colten Aguero MD, 02/19/23, 1:09 PM EST.   Pulmonary / Critical care medicine

## 2023-02-20 VITALS
BODY MASS INDEX: 42.52 KG/M2 | RESPIRATION RATE: 16 BRPM | TEMPERATURE: 97.8 F | WEIGHT: 240 LBS | OXYGEN SATURATION: 96 % | HEART RATE: 73 BPM | HEIGHT: 63 IN | DIASTOLIC BLOOD PRESSURE: 60 MMHG | SYSTOLIC BLOOD PRESSURE: 121 MMHG

## 2023-02-20 PROCEDURE — 94799 UNLISTED PULMONARY SVC/PX: CPT

## 2023-02-20 PROCEDURE — 99239 HOSP IP/OBS DSCHRG MGMT >30: CPT | Performed by: INTERNAL MEDICINE

## 2023-02-20 PROCEDURE — G0378 HOSPITAL OBSERVATION PER HR: HCPCS

## 2023-02-20 PROCEDURE — 63710000001 PREDNISONE PER 1 MG: Performed by: INTERNAL MEDICINE

## 2023-02-20 RX ORDER — BUDESONIDE 0.5 MG/2ML
0.5 INHALANT ORAL
Qty: 60 ML | Refills: 0 | Status: SHIPPED | OUTPATIENT
Start: 2023-02-20 | End: 2023-03-08

## 2023-02-20 RX ORDER — BUDESONIDE AND FORMOTEROL FUMARATE DIHYDRATE 160; 4.5 UG/1; UG/1
2 AEROSOL RESPIRATORY (INHALATION)
Refills: 12
Start: 2023-02-20 | End: 2023-03-08 | Stop reason: SDUPTHER

## 2023-02-20 RX ORDER — PREDNISONE 10 MG/1
TABLET ORAL
Qty: 20 TABLET | Refills: 0 | Status: SHIPPED | OUTPATIENT
Start: 2023-02-20 | End: 2023-02-28

## 2023-02-20 RX ORDER — DOXYCYCLINE HYCLATE 100 MG/1
100 CAPSULE ORAL 2 TIMES DAILY
Qty: 10 CAPSULE | Refills: 0 | Status: SHIPPED | OUTPATIENT
Start: 2023-02-20 | End: 2023-03-08

## 2023-02-20 RX ADMIN — MONTELUKAST 10 MG: 10 TABLET, FILM COATED ORAL at 09:13

## 2023-02-20 RX ADMIN — FLUOXETINE 20 MG: 20 CAPSULE ORAL at 09:12

## 2023-02-20 RX ADMIN — OLANZAPINE 20 MG: 5 TABLET, FILM COATED ORAL at 09:21

## 2023-02-20 RX ADMIN — IPRATROPIUM BROMIDE AND ALBUTEROL SULFATE 3 ML: 2.5; .5 SOLUTION RESPIRATORY (INHALATION) at 06:51

## 2023-02-20 RX ADMIN — DOXYCYCLINE 100 MG: 100 INJECTION, POWDER, LYOPHILIZED, FOR SOLUTION INTRAVENOUS at 09:10

## 2023-02-20 RX ADMIN — PREDNISONE 40 MG: 20 TABLET ORAL at 09:13

## 2023-02-20 RX ADMIN — TOPIRAMATE 100 MG: 100 TABLET, FILM COATED ORAL at 09:13

## 2023-02-20 RX ADMIN — PROPRANOLOL HYDROCHLORIDE 20 MG: 10 TABLET ORAL at 09:10

## 2023-02-20 RX ADMIN — LAMOTRIGINE 100 MG: 100 TABLET ORAL at 09:13

## 2023-02-20 RX ADMIN — BUDESONIDE 0.5 MG: 0.5 SUSPENSION RESPIRATORY (INHALATION) at 06:51

## 2023-02-20 RX ADMIN — FLUOXETINE 40 MG: 20 CAPSULE ORAL at 09:20

## 2023-02-20 NOTE — PLAN OF CARE
Problem: Adult Inpatient Plan of Care  Goal: Plan of Care Review  Outcome: Met  Flowsheets (Taken 2/20/2023 1011)  Progress: improving  Plan of Care Reviewed With: patient  Outcome Evaluation: VSS, RA, frequent cough, congested. Denies SOA at this time. Pt to follow up with PCP who is to refer pt to Pulmonologist of her choice. [facility] staff attempted to set up PCP apt but not able to reach office staff to set up apt. Pt instruced to call to set up apt.  Goal: Patient-Specific Goal (Individualized)  Outcome: Met  Goal: Absence of Hospital-Acquired Illness or Injury  Outcome: Met  Intervention: Identify and Manage Fall Risk  Recent Flowsheet Documentation  Taken 2/20/2023 0800 by Kenya Underwood RN  Safety Promotion/Fall Prevention:   activity supervised   fall prevention program maintained   nonskid shoes/slippers when out of bed   safety round/check completed  Intervention: Prevent Skin Injury  Recent Flowsheet Documentation  Taken 2/20/2023 0800 by Kenya Underwood RN  Body Position: position changed independently  Intervention: Prevent and Manage VTE (Venous Thromboembolism) Risk  Recent Flowsheet Documentation  Taken 2/20/2023 0800 by Kenya Underwood RN  Activity Management: up ad denita  Goal: Optimal Comfort and Wellbeing  Outcome: Met  Intervention: Provide Person-Centered Care  Recent Flowsheet Documentation  Taken 2/20/2023 0800 by Kenya Underwood RN  Trust Relationship/Rapport:   care explained   thoughts/feelings acknowledged  Goal: Readiness for Transition of Care  Outcome: Met   Goal Outcome Evaluation:  Plan of Care Reviewed With: patient        Progress: improving  Outcome Evaluation: VSS, RA, frequent cough, congested. Denies SOA at this time. Pt to follow up with PCP who is to refer pt to Pulmonologist of her choice. [facility] staff attempted to set up PCP apt but not able to reach office staff to set up apt. Pt instruced to call to set up apt.

## 2023-02-20 NOTE — DISCHARGE SUMMARY
[facility] Medicine Services  DISCHARGE SUMMARY    Patient Name: Katheryn Garcia  : 1960  MRN: [id number]    Date of Admission: 2023  1:40 PM  Date of Discharge:  23  Primary Care Physician: Provider, No Known    Consults     No orders found from 2023 to 2023.          Hospital Course     Presenting Problem:   Severe persistent asthma with acute exacerbation [J45.51]    Active Hospital Problems    Diagnosis  POA   • **Acute respiratory failure with hypoxia (HCC) [J96.01]  Yes   • Severe persistent asthma with acute exacerbation [J45.51]  Yes   • Rhinovirus infection [B34.8]  Yes   • Elevated troponin [R77.8]  Yes   • Anxiety and depression [F41.9, F32.A]  Yes   • Essential tremor [G25.0]  Yes      Resolved Hospital Problems   No resolved problems to display.          Hospital Course:  Katheryn Garcia is a 62 y.o. female with history of severe persistent asthma, essential tremor, anxiety and depression presents with SOA, cough, congestion and wheezing     Asthma exacerbation  Rhinovirus infection  - patient agreeable to prednisone therapy -- with clinical improvement noted while hospitized  - home on prednisone taper  - empiric doxycycline  - patient would like to continue short course of budesonide nebs BID post discharge, Rx provided  - patient somewhat non-compliant with home symbicort -- discussed that for this medication to be helpful, it needs to be taken on a regular basis  - discussed referral to pulmonology at discharge to establish care, obtain PFTs and consideration of sleep study -- patient says she would like to discuss with her PCP and other medical friends to help her decide whether she should come to [facility] or instead remain in the [facility] system.  PCP may wish to place referral of patient's choice  - follow up with PCP in one week.     Obesity  - BMI 42  - would consider sleep study to evaluate for JAY     Anxiety/Depression  -  home meds continued     Essential tremor      Discharge Follow Up Recommendations for outpatient labs/diagnostics:  PCP to refer patient to Pulmonology group of patient's preference    Day of Discharge     HPI:   Feels much better, still slight wheezing. Wants to go home today.    Review of Systems  Gen: no fever  Pulm: no coughing    Vital Signs:   Temp:  [97.5 °F (36.4 °C)-98.5 °F (36.9 °C)] 97.8 °F (36.6 °C)  Heart Rate:  [60-92] 66  Resp:  [16] 16  BP: (113-123)/(60-77) 121/60  Flow (L/min):  [1-3] 1      Physical Exam:  Constitutional - no acute distress, nontoxic, in bed  HEENT-NCAT, mucous membranes moist  CV-RRR, S1 S2 normal, no m/r/g  Resp-scattered bilateral expiratory wheezes, however good air movement noted  Abd-soft, nontender, nondistended, normoactive bowel sounds  Ext-No lower extremity cyanosis, clubbing or edema bilaterally  Neuro-alert, speech clear, moves all extremities   Psych-normal affect   Skin- No rash on exposed UE or LE bilaterally      Pertinent  and/or Most Recent Results     LAB RESULTS:      Lab 02/19/23  0550 02/18/23  1848 02/18/23  1400   WBC 6.05  --  11.05*   HEMOGLOBIN 12.0  --  12.8   HEMATOCRIT 38.3  --  41.2   PLATELETS 133*  --  160   NEUTROS ABS  --   --  8.23*   IMMATURE GRANS (ABS)  --   --  0.04   LYMPHS ABS  --   --  1.69   MONOS ABS  --   --  0.86   EOS ABS  --   --  0.19   MCV 94.8  --  93.8   PROCALCITONIN  --  0.06  --    LACTATE  --   --  1.3         Lab 02/19/23  0550 02/18/23  1400   SODIUM 140 137   POTASSIUM 3.7 3.6   CHLORIDE 105 101   CO2 25.0 23.0   ANION GAP 10.0 13.0   BUN 13 11   CREATININE 0.96 0.94   EGFR 67.9 69.6   GLUCOSE 104* 123*   CALCIUM 8.9 9.3   HEMOGLOBIN A1C 5.10  --          Lab 02/18/23  1400   TOTAL PROTEIN 7.4   ALBUMIN 4.7   GLOBULIN 2.7   ALT (SGPT) 21   AST (SGOT) 17   BILIRUBIN 0.4   ALK PHOS 141*         Lab 02/18/23  2112 02/18/23  1848 02/18/23  1400   PROBNP  --   --  114.9   HSTROP T 7 10* <6                 Brief Urine Lab  Results     None        Microbiology Results (last 10 days)     Procedure Component Value - Date/Time    Blood Culture - Blood, Arm, Left [[id number]]  (Normal) Collected: 02/18/23 1400    Lab Status: Preliminary result Specimen: Blood from Arm, Left Updated: 02/19/23 1645     Blood Culture No growth at 24 hours    Blood Culture - Blood, Arm, Right [[id number]]  (Normal) Collected: 02/18/23 1357    Lab Status: Preliminary result Specimen: Blood from Arm, Right Updated: 02/19/23 1645     Blood Culture No growth at 24 hours    COVID PRE-OP / PRE-PROCEDURE SCREENING ORDER (NO ISOLATION) - Swab, Nasopharynx [[id number]]  (Abnormal) Collected: 02/18/23 1350    Lab Status: Final result Specimen: Swab from Nasopharynx Updated: 02/18/23 1528    Narrative:      The following orders were created for panel order COVID PRE-OP / PRE-PROCEDURE SCREENING ORDER (NO ISOLATION) - Swab, Nasopharynx.  Procedure                               Abnormality         Status                     ---------                               -----------         ------                     Respiratory Panel PCR w/...[[id number]]  Abnormal            Final result                 Please view results for these tests on the individual orders.    Respiratory Panel PCR w/COVID-19(SARS-CoV-2) LEROY/VISHNU/RUPERT/PAD/COR/MAD/SHELDON In-House, NP Swab in UTM/VTM, 3-4 HR TAT - Swab, Nasopharynx [[id number]]  (Abnormal) Collected: 02/18/23 1350    Lab Status: Final result Specimen: Swab from Nasopharynx Updated: 02/18/23 1528     ADENOVIRUS, PCR Not Detected     Coronavirus 229E Not Detected     Coronavirus HKU1 Not Detected     Coronavirus NL63 Not Detected     Coronavirus OC43 Not Detected     COVID19 Not Detected     Human Metapneumovirus Not Detected     Human Rhinovirus/Enterovirus Detected     Influenza A PCR Not Detected     Influenza B PCR Not Detected     Parainfluenza Virus 1 Not Detected     Parainfluenza Virus 2 Not Detected     Parainfluenza Virus 3 Not Detected      Parainfluenza Virus 4 Not Detected     RSV, PCR Not Detected     Bordetella pertussis pcr Not Detected     Bordetella parapertussis PCR Not Detected     Chlamydophila pneumoniae PCR Not Detected     Mycoplasma pneumo by PCR Not Detected    Narrative:      In the setting of a positive respiratory panel with a viral infection PLUS a negative procalcitonin without other underlying concern for bacterial infection, consider observing off antibiotics or discontinuation of antibiotics and continue supportive care. If the respiratory panel is positive for atypical bacterial infection (Bordetella pertussis, Chlamydophila pneumoniae, or Mycoplasma pneumoniae), consider antibiotic de-escalation to target atypical bacterial infection.          XR Chest 1 View    Result Date: 2/18/2023  XR CHEST 1 VW Date of Exam: 2/18/2023 1:55 PM EST Indication: SOA triage protocol. Comparison: None available. Findings: Normal cardiomediastinal silhouette. The lungs are clear. No pleural effusion or pneumothorax. No acute osseous findings.     Impression: No acute cardiopulmonary findings.  Electronically Signed: Nick Melgar  2/18/2023 2:37 PM EST  Workstation ID: [id number]                  Plan for Follow-up of Pending Labs/Results:   Pending Labs     Order Current Status    Blood Culture - Blood, Arm, Left Preliminary result    Blood Culture - Blood, Arm, Right Preliminary result        Discharge Details        Discharge Medications      New Medications      Instructions Start Date   budesonide 0.5 MG/2ML nebulizer solution  Commonly known as: PULMICORT   0.5 mg, Nebulization, 2 Times Daily - RT      budesonide-formoterol 160-4.5 MCG/ACT inhaler  Commonly known as: Symbicort   2 puffs, Inhalation, 2 Times Daily - RT      doxycycline 100 MG capsule  Commonly known as: VIBRAMYCIN   100 mg, Oral, 2 Times Daily      predniSONE 10 MG tablet  Commonly known as: DELTASONE   10 mg, Oral, Take As Directed, Take 4 tablets/day x 2 days, then take 3  tablets/day x 2 days, then 2 tablets/day x 2 days, then 1 tablet/day x 2 days, then stop         Continue These Medications      Instructions Start Date   FLUoxetine 40 MG capsule  Commonly known as: PROzac   TAKE 1 CAPSULE BY MOUTH ONCE DAILY ALONG WITH 20MG CAPSULE      FLUoxetine 20 MG capsule  Commonly known as: PROzac   TAKE 1 CAPSULE BY MOUTH ONCE DAILY ALONG WITH THE 40MG CAPSULE      lamoTRIgine 100 MG tablet  Commonly known as: LaMICtal   No dose, route, or frequency recorded.      montelukast 10 MG tablet  Commonly known as: SINGULAIR   1 tablet, Oral, Daily      OLANZapine 20 MG tablet  Commonly known as: zyPREXA   1 tablet, Oral, Daily      propranolol 10 MG tablet  Commonly known as: INDERAL   2 tablets, Oral, Every 12 Hours Scheduled      topiramate 100 MG tablet  Commonly known as: TOPAMAX   1 tablet, Oral, Every 12 Hours Scheduled             Allergies   Allergen Reactions   • Prednisone Delirium     PT STATED STEROID PSYCHOSIS         Discharge Disposition:  Home or Self Care    Diet:  Hospital:  Diet Order   Procedures   • Diet: Regular/House Diet; Texture: Regular Texture (IDDSI 7); Fluid Consistency: Thin (IDDSI 0)       Activity:      Restrictions or Other Recommendations:         CODE STATUS:    Code Status and Medical Interventions:   Ordered at: 02/18/23 1953     Level Of Support Discussed With:    Patient     Code Status (Patient has no pulse and is not breathing):    CPR (Attempt to Resuscitate)     Medical Interventions (Patient has pulse or is breathing):    Full Support       No future appointments.    Additional Instructions for the Follow-ups that You Need to Schedule     Discharge Follow-up with PCP   As directed       Currently Documented PCP:    Provider, No Known    PCP Phone Number:    None     Follow Up Details: follow up with PCP in one week                     Adama Peraza MD  02/20/23      Time Spent on Discharge:  I spent  35  minutes on this discharge activity which  included: face-to-face encounter with the patient, reviewing the data in the system, coordination of the care with the nursing staff as well as consultants, documentation, and entering orders.

## 2023-02-20 NOTE — CASE MANAGEMENT/SOCIAL WORK
Case Management Discharge Note      Final Note: I talked with patient and  at . Patient lives in [place] with . Independent at home, no dme except a nebulizer, no home 02, no HH. She has coverage for medications and has transportation home. Patient has been discharged.         Selected Continued Care - Admitted Since 2/18/2023     Destination    No services have been selected for the patient.              Durable Medical Equipment    No services have been selected for the patient.              Dialysis/Infusion    No services have been selected for the patient.              Home Medical Care    No services have been selected for the patient.              Therapy    No services have been selected for the patient.              Community Resources    No services have been selected for the patient.              Community & DME    No services have been selected for the patient.                       Final Discharge Disposition Code: 01 - home or self-care

## 2023-02-23 LAB
BACTERIA SPEC AEROBE CULT: NORMAL
BACTERIA SPEC AEROBE CULT: NORMAL

## 2023-03-08 ENCOUNTER — OFFICE VISIT (OUTPATIENT)
Dept: PULMONOLOGY | Facility: CLINIC | Age: 63
End: 2023-03-08
Payer: MEDICARE

## 2023-03-08 VITALS
DIASTOLIC BLOOD PRESSURE: 82 MMHG | TEMPERATURE: 97.9 F | HEIGHT: 63 IN | OXYGEN SATURATION: 98 % | BODY MASS INDEX: 40.96 KG/M2 | HEART RATE: 64 BPM | SYSTOLIC BLOOD PRESSURE: 122 MMHG | WEIGHT: 231.2 LBS

## 2023-03-08 DIAGNOSIS — E66.01 CLASS 3 SEVERE OBESITY DUE TO EXCESS CALORIES WITH SERIOUS COMORBIDITY AND BODY MASS INDEX (BMI) OF 40.0 TO 44.9 IN ADULT: ICD-10-CM

## 2023-03-08 DIAGNOSIS — R06.02 SHORTNESS OF BREATH: Primary | ICD-10-CM

## 2023-03-08 DIAGNOSIS — J45.51 SEVERE PERSISTENT ASTHMA WITH ACUTE EXACERBATION: ICD-10-CM

## 2023-03-08 PROBLEM — J96.01 ACUTE RESPIRATORY FAILURE WITH HYPOXIA: Status: RESOLVED | Noted: 2023-02-18 | Resolved: 2023-03-08

## 2023-03-08 PROBLEM — B34.8 RHINOVIRUS INFECTION: Status: RESOLVED | Noted: 2023-02-18 | Resolved: 2023-03-08

## 2023-03-08 PROCEDURE — 86003 ALLG SPEC IGE CRUDE XTRC EA: CPT | Performed by: INTERNAL MEDICINE

## 2023-03-08 PROCEDURE — 85025 COMPLETE CBC W/AUTO DIFF WBC: CPT | Performed by: INTERNAL MEDICINE

## 2023-03-08 PROCEDURE — 99204 OFFICE O/P NEW MOD 45 MIN: CPT | Performed by: INTERNAL MEDICINE

## 2023-03-08 PROCEDURE — 1159F MED LIST DOCD IN RCRD: CPT | Performed by: INTERNAL MEDICINE

## 2023-03-08 PROCEDURE — 36415 COLL VENOUS BLD VENIPUNCTURE: CPT | Performed by: INTERNAL MEDICINE

## 2023-03-08 PROCEDURE — 86037 ANCA TITER EACH ANTIBODY: CPT | Performed by: INTERNAL MEDICINE

## 2023-03-08 PROCEDURE — 1160F RVW MEDS BY RX/DR IN RCRD: CPT | Performed by: INTERNAL MEDICINE

## 2023-03-08 PROCEDURE — 82785 ASSAY OF IGE: CPT | Performed by: INTERNAL MEDICINE

## 2023-03-08 PROCEDURE — 83516 IMMUNOASSAY NONANTIBODY: CPT | Performed by: INTERNAL MEDICINE

## 2023-03-08 PROCEDURE — 94729 DIFFUSING CAPACITY: CPT | Performed by: INTERNAL MEDICINE

## 2023-03-08 PROCEDURE — 94726 PLETHYSMOGRAPHY LUNG VOLUMES: CPT | Performed by: INTERNAL MEDICINE

## 2023-03-08 PROCEDURE — 94010 BREATHING CAPACITY TEST: CPT | Performed by: INTERNAL MEDICINE

## 2023-03-08 RX ORDER — CETIRIZINE HYDROCHLORIDE 10 MG/1
1 TABLET ORAL DAILY
COMMUNITY
Start: 2023-02-28

## 2023-03-08 RX ORDER — MONTELUKAST SODIUM 10 MG/1
10 TABLET ORAL NIGHTLY
Qty: 90 TABLET | Refills: 3 | Status: SHIPPED | OUTPATIENT
Start: 2023-03-08

## 2023-03-08 RX ORDER — BUDESONIDE AND FORMOTEROL FUMARATE DIHYDRATE 160; 4.5 UG/1; UG/1
2 AEROSOL RESPIRATORY (INHALATION)
Qty: 3 EACH | Refills: 5
Start: 2023-03-08

## 2023-03-08 RX ORDER — ALBUTEROL SULFATE 2.5 MG/3ML
2.5 SOLUTION RESPIRATORY (INHALATION) 4 TIMES DAILY PRN
Qty: 360 ML | Refills: 11 | Status: SHIPPED | OUTPATIENT
Start: 2023-03-08

## 2023-03-08 RX ORDER — ALBUTEROL SULFATE 90 UG/1
2 AEROSOL, METERED RESPIRATORY (INHALATION) EVERY 4 HOURS PRN
Qty: 18 G | Refills: 11 | Status: SHIPPED | OUTPATIENT
Start: 2023-03-08

## 2023-03-08 NOTE — PROGRESS NOTES
New Patient Pulmonary Office Visit      Patient Name: Katheryn Garcia    Referring Physician: Tiffany Kelley APRN    Chief Complaint:    Chief Complaint   Patient presents with   • Asthma       History of Present Illness: Katheryn Garcia is a 62 y.o. female who is here today to establish care with Pulmonary.  Patient has a past medical history significant for anxiety depression and morbid obesity.  Who was referred to pulmonary for evaluation of shortness of breath.  The patient was hospitalized in February 2023 with rhinovirus and apparent asthma exacerbation at that time.  Patient states that she was diagnosed with asthma many years ago, used to see 1 my partners Dr. Aviles back in the mid 90s, but ultimately was told there was not much they can do at the time so she has dealt with the fact that she has required steroids and inhalers for the majority of her life.  Within the last year she has developed significant worsening of her symptoms.  Has been on steroids at least 3 times and then will had 1 hospitalization in February.  She was diagnosed with rhinovirus at that time.  She does admit that whenever this occurred she was not taking her Symbicort as frequently as she should have but when she was using her albuterol fairly frequently.  She is on Singulair.  She has a significant issue with prednisone it caused her to develop psychotic issues and is now on Zyprexa.  This is mainly because she has gotten steroids so frequently over the last couple of years.  She has also had significant weight gain from the steroids.  She does have some limitations with agoraphobia and has significant trouble leaving the house.    Review of Systems:   Review of Systems   Constitutional: Negative for activity change, appetite change, chills and diaphoresis.   HENT: Negative for congestion, postnasal drip, sinus pressure and voice change.    Eyes: Negative for blurred vision.   Respiratory: Positive for shortness of breath and  wheezing. Negative for cough.    Cardiovascular: Negative for chest pain.   Gastrointestinal: Negative for abdominal pain.   Musculoskeletal: Negative for myalgias.   Skin: Negative for color change and dry skin.   Allergic/Immunologic: Negative for environmental allergies.   Neurological: Negative for weakness and confusion.   Hematological: Negative for adenopathy.   Psychiatric/Behavioral: Positive for behavioral problems and depressed mood. Negative for sleep disturbance. The patient is nervous/anxious.        Past Medical History:   Past Medical History:   Diagnosis Date   • Asthma    • Mood disorder (HCC)        Past Surgical History:   Past Surgical History:   Procedure Laterality Date   • CHOLECYSTECTOMY     • HYSTERECTOMY         Family History: History reviewed. No pertinent family history.    Social History:   Social History     Socioeconomic History   • Marital status:    Tobacco Use   • Smoking status: Never     Passive exposure: Never   • Smokeless tobacco: Never   Vaping Use   • Vaping Use: Never used   Substance and Sexual Activity   • Alcohol use: Never   • Drug use: Never   • Sexual activity: Defer       Medications:     Current Outpatient Medications:   •  budesonide-formoterol (Symbicort) 160-4.5 MCG/ACT inhaler, Inhale 2 puffs 2 (Two) Times a Day., Disp: 3 each, Rfl: 5  •  cetirizine (zyrTEC) 10 MG tablet, Take 1 tablet by mouth Daily., Disp: , Rfl:   •  FLUoxetine (PROzac) 20 MG capsule, TAKE 1 CAPSULE BY MOUTH ONCE DAILY ALONG WITH THE 40MG CAPSULE, Disp: , Rfl:   •  FLUoxetine (PROzac) 40 MG capsule, TAKE 1 CAPSULE BY MOUTH ONCE DAILY ALONG WITH 20MG CAPSULE, Disp: , Rfl:   •  lamoTRIgine (LaMICtal) 100 MG tablet, , Disp: , Rfl:   •  montelukast (SINGULAIR) 10 MG tablet, Take 1 tablet by mouth Every Night., Disp: 90 tablet, Rfl: 3  •  OLANZapine (zyPREXA) 20 MG tablet, Take 1 tablet by mouth Daily., Disp: , Rfl:   •  propranolol (INDERAL) 10 MG tablet, Take 2 tablets by mouth Every 12  "(Twelve) Hours., Disp: , Rfl:   •  topiramate (TOPAMAX) 100 MG tablet, Take 1 tablet by mouth Every 12 (Twelve) Hours., Disp: , Rfl:   •  albuterol (PROVENTIL) (2.5 MG/3ML) 0.083% nebulizer solution, Take 2.5 mg by nebulization 4 (Four) Times a Day As Needed for Wheezing., Disp: 360 mL, Rfl: 11  •  albuterol sulfate  (90 Base) MCG/ACT inhaler, Inhale 2 puffs Every 4 (Four) Hours As Needed for Wheezing., Disp: 18 g, Rfl: 11    Allergies:   Allergies   Allergen Reactions   • Prednisone Delirium     PT STATED STEROID PSYCHOSIS       Physical Exam:  Vital Signs:   Vitals:    03/08/23 0858   BP: 122/82   Pulse: 64   Temp: 97.9 °F (36.6 °C)   SpO2: 98%  Comment: resting, room air   Weight: 105 kg (231 lb 3.2 oz)   Height: 160 cm (63\")   BMI of 40    Physical Exam  Vitals and nursing note reviewed.   Constitutional:       General: She is not in acute distress.     Appearance: She is well-developed. She is obese. She is not ill-appearing or toxic-appearing.   HENT:      Head: Normocephalic and atraumatic.   Cardiovascular:      Rate and Rhythm: Normal rate and regular rhythm.      Pulses: Normal pulses.      Heart sounds: Normal heart sounds. No murmur heard.    No friction rub. No gallop.   Pulmonary:      Effort: Pulmonary effort is normal. No respiratory distress.      Breath sounds: Normal breath sounds. No wheezing, rhonchi or rales.   Musculoskeletal:      Right lower leg: No edema.      Left lower leg: No edema.   Skin:     General: Skin is warm and dry.   Neurological:      Mental Status: She is alert and oriented to person, place, and time.         Immunization History   Administered Date(s) Administered   • COVID-19 (PFIZER) PURPLE CAP 03/20/2021, 04/13/2021   • Covid-19 (Pfizer) Gray Cap 02/07/2022   • Flublok 18+yrs 11/05/2022   • Influenza Quad Vaccine (Inpatient) 10/15/2010       Results Review:   - I personally reviewed the pts imaging from chest x-ray 2/18/2023 shows no acute cardiopulmonary " process.  - I personally reviewed the pts labs from hospitalization in February 2023, notably CBC had an elevated white blood cell count of 11,000 initially, procalcitonin was normal, viral panel positive for rhinovirus  - I personally reviewed the pts PFT from 3/8/2023 shows moderate obstruction without restriction and a mildly reduced DLCO, FEV1 is 66%.  - I personally reviewed the pts chart with regards to hospitalization for possible asthma exacerbation in the setting of rhinovirus with acute hypoxic respiratory failure.    Assessment / Plan:   Diagnoses and all orders for this visit:    1. Shortness of breath (Primary)  2. Severe persistent asthma with acute exacerbation  -No significant problems with asthma, and has required multiple rounds of steroids.  She may be a viable candidate for Biologics.  Therefore I am going to order secondary causes of asthma work-up as noted below.  In addition to this I have reinforced with her how to utilize her Symbicort which is 2 puffs twice daily she was given an AeroChamber on the visit today.  Also reinforced with her the appropriate use of albuterol and she has albuterol nebs and MDIs to use as needed.  I will follow-up on her labs at the next visit if she continues to have ongoing issues despite optimizing out her inhaler therapy then I would ultimately recommend that we start biologic therapy.  In addition to that she has a significant issue with steroids and this is led to a lot of weight gain over the course of the last couple of years hopefully Biologics can help avoid prednisone was not only because of the weight issues but also worsening of her psychiatric state.  -Patient can discontinue the nebulized budesonide at this point    Labs ordered today:  -     Allergens, Zone 8  -     ANCA Panel  -     Aspergillus Fumigatus IgE  -     CBC & Differential  -     IgE    3. Class 3 severe obesity due to excess calories with serious comorbidity and body mass index (BMI) of  40.0 to 44.9 in adult (HCC)  -For now would recommend limiting calories and working on diet and exercise.  Hopefully as we optimize her asthma better exercise will become easier for her and allow her to lose weight over time.  Which also should help her asthma.      Follow Up:   Return in about 2 months (around 5/8/2023) for Labs.     JACQUELYN Smith, DO  Pulmonary and Critical Care Medicine  Note Electronically Signed    Part of this note may be an electronic transcription/translation of spoken language to printed text using the Dragon Dictation System.

## 2023-03-09 LAB
BASOPHILS # BLD AUTO: 0.03 10*3/MM3 (ref 0–0.2)
BASOPHILS NFR BLD AUTO: 0.5 % (ref 0–1.5)
DEPRECATED RDW RBC AUTO: 45.9 FL (ref 37–54)
EOSINOPHIL # BLD AUTO: 0.18 10*3/MM3 (ref 0–0.4)
EOSINOPHIL NFR BLD AUTO: 3 % (ref 0.3–6.2)
ERYTHROCYTE [DISTWIDTH] IN BLOOD BY AUTOMATED COUNT: 14 % (ref 12.3–15.4)
HCT VFR BLD AUTO: 38.4 % (ref 34–46.6)
HGB BLD-MCNC: 12.4 G/DL (ref 12–15.9)
LYMPHOCYTES # BLD AUTO: 1.56 10*3/MM3 (ref 0.7–3.1)
LYMPHOCYTES NFR BLD AUTO: 26.3 % (ref 19.6–45.3)
MCH RBC QN AUTO: 29.2 PG (ref 26.6–33)
MCHC RBC AUTO-ENTMCNC: 32.3 G/DL (ref 31.5–35.7)
MCV RBC AUTO: 90.4 FL (ref 79–97)
MONOCYTES # BLD AUTO: 0.42 10*3/MM3 (ref 0.1–0.9)
MONOCYTES NFR BLD AUTO: 7.1 % (ref 5–12)
NEUTROPHILS NFR BLD AUTO: 3.74 10*3/MM3 (ref 1.7–7)
NEUTROPHILS NFR BLD AUTO: 62.9 % (ref 42.7–76)
PLATELET # BLD AUTO: 143 10*3/MM3 (ref 140–450)
PMV BLD AUTO: 14.2 FL (ref 6–12)
RBC # BLD AUTO: 4.25 10*6/MM3 (ref 3.77–5.28)
WBC NRBC COR # BLD: 5.94 10*3/MM3 (ref 3.4–10.8)

## 2023-03-10 LAB
C-ANCA TITR SER IF: ABNORMAL TITER
MYELOPEROXIDASE AB SER IA-ACNC: <0.2 UNITS (ref 0–0.9)
P-ANCA ATYPICAL TITR SER IF: ABNORMAL TITER
P-ANCA TITR SER IF: ABNORMAL TITER
PROTEINASE3 AB SER IA-ACNC: <0.2 UNITS (ref 0–0.9)

## 2023-03-12 LAB
A ALTERNATA IGE QN: <0.1 KU/L
A FUMIGATUS IGE QN: <0.1 KU/L
A FUMIGATUS IGE QN: <0.1 KU/L
AMER ROACH IGE QN: <0.1 KU/L
BAHIA GRASS IGE QN: <0.1 KU/L
BERMUDA GRASS IGE QN: <0.1 KU/L
BOXELDER IGE QN: <0.1 KU/L
C HERBARUM IGE QN: <0.1 KU/L
CAT DANDER IGE QN: <0.1 KU/L
CMN PIGWEED IGE QN: <0.1 KU/L
COMMON RAGWEED IGE QN: <0.1 KU/L
CONV CLASS DESCRIPTION: NORMAL
D FARINAE IGE QN: <0.1 KU/L
D PTERONYSS IGE QN: <0.1 KU/L
DOG DANDER IGE QN: <0.1 KU/L
ENGL PLANTAIN IGE QN: <0.1 KU/L
HAZELNUT POLN IGE QN: <0.1 KU/L
IGE SERPL-ACNC: 6 IU/ML (ref 6–495)
JOHNSON GRASS IGE QN: <0.1 KU/L
KENT BLUE GRASS IGE QN: <0.1 KU/L
LONDON PLANE IGE QN: <0.1 KU/L
M RACEMOSUS IGE QN: <0.1 KU/L
MT JUNIPER IGE QN: <0.1 KU/L
MUGWORT IGE QN: <0.1 KU/L
NETTLE IGE QN: <0.1 KU/L
P NOTATUM IGE QN: <0.1 KU/L
S BOTRYOSUM IGE QN: <0.1 KU/L
SHEEP SORREL IGE QN: <0.1 KU/L
SWEET GUM IGE QN: <0.1 KU/L
WHITE ELM IGE QN: <0.1 KU/L
WHITE HICKORY IGE QN: <0.1 KU/L
WHITE MULBERRY IGE QN: <0.1 KU/L
WHITE OAK IGE QN: <0.1 KU/L

## 2023-06-08 ENCOUNTER — OFFICE VISIT (OUTPATIENT)
Dept: PULMONOLOGY | Facility: CLINIC | Age: 63
End: 2023-06-08
Payer: MEDICARE

## 2023-06-08 VITALS
HEART RATE: 63 BPM | HEIGHT: 63 IN | TEMPERATURE: 98 F | WEIGHT: 210.5 LBS | BODY MASS INDEX: 37.3 KG/M2 | DIASTOLIC BLOOD PRESSURE: 72 MMHG | SYSTOLIC BLOOD PRESSURE: 112 MMHG | OXYGEN SATURATION: 97 %

## 2023-06-08 DIAGNOSIS — E66.01 CLASS 3 SEVERE OBESITY DUE TO EXCESS CALORIES WITH SERIOUS COMORBIDITY AND BODY MASS INDEX (BMI) OF 40.0 TO 44.9 IN ADULT: ICD-10-CM

## 2023-06-08 DIAGNOSIS — J45.50 SEVERE PERSISTENT ASTHMA WITHOUT COMPLICATION: Primary | ICD-10-CM

## 2023-06-08 PROBLEM — E66.813 CLASS 3 SEVERE OBESITY DUE TO EXCESS CALORIES WITH SERIOUS COMORBIDITY AND BODY MASS INDEX (BMI) OF 40.0 TO 44.9 IN ADULT: Status: ACTIVE | Noted: 2023-06-08

## 2023-06-08 RX ORDER — ROSUVASTATIN CALCIUM 10 MG/1
1 TABLET, COATED ORAL DAILY
COMMUNITY
Start: 2023-05-05

## 2023-06-08 RX ORDER — FLUTICASONE PROPIONATE 50 MCG
1 SPRAY, SUSPENSION (ML) NASAL DAILY
COMMUNITY
Start: 2023-05-26

## 2023-06-08 NOTE — PROGRESS NOTES
"Follow Up Office Note       Patient Name: Katheryn Garcia    Referring Physician: No ref. provider found    Chief Complaint:    Chief Complaint   Patient presents with    Shortness of Breath     Follow up       History of Present Illness: Katheryn Garcia is a 62 y.o. female who is here today to follow-up care with Pulmonary.  Patient has a past medical history significant for anxiety depression and morbid obesity.  Patient is currently doing very well with Symbicort and albuterol.  Allergies well controlled.  Has lost 30 pounds since saw her last.  No other acute complaints.    Review of Systems:   Review of Systems   Constitutional:  Negative for chills, fatigue and fever.   HENT:  Negative for congestion and voice change.    Eyes:  Negative for blurred vision.   Respiratory:  Negative for cough, shortness of breath and wheezing.    Cardiovascular:  Negative for chest pain.   Skin:  Negative for dry skin.   Hematological:  Negative for adenopathy.   Psychiatric/Behavioral:  Negative for agitation and depressed mood.      The following portions of the patient's history were reviewed and updated as appropriate: allergies, current medications, past family history, past medical history, past social history, past surgical history and problem list.    Physical Exam:  Vital Signs:   Vitals:    06/08/23 1542   BP: 112/72   BP Location: Left arm   Patient Position: Sitting   Cuff Size: Adult   Pulse: 63   Temp: 98 °F (36.7 °C)   SpO2: 97%  Comment: Room air at rest   Weight: 95.5 kg (210 lb 8 oz)   Height: 160 cm (63\")       Physical Exam  Vitals and nursing note reviewed.   Constitutional:       General: She is not in acute distress.     Appearance: She is well-developed and normal weight. She is not ill-appearing or toxic-appearing.   HENT:      Head: Normocephalic and atraumatic.   Cardiovascular:      Rate and Rhythm: Normal rate and regular rhythm.      Pulses: Normal pulses.      Heart sounds: Normal heart sounds. No " murmur heard.    No friction rub. No gallop.   Pulmonary:      Effort: Pulmonary effort is normal. No respiratory distress.      Breath sounds: Normal breath sounds. No wheezing, rhonchi or rales.   Musculoskeletal:      Right lower leg: No edema.      Left lower leg: No edema.   Skin:     General: Skin is warm and dry.   Neurological:      Mental Status: She is alert and oriented to person, place, and time.       Immunization History   Administered Date(s) Administered    COVID-19 (PFIZER) Purple Cap Monovalent 03/20/2021, 04/13/2021    Covid-19 (Pfizer) Gray Cap Monovalent 02/07/2022    Flublok 18+yrs 11/05/2022    Influenza Quad Vaccine (Inpatient) 10/15/2010    Pneumococcal Conjugate 20-Valent (PCV20) 05/26/2023    Shingrix 05/26/2023       Results Review:   - chest x-ray 2/18/2023 shows no acute cardiopulmonary process.  -I reviewed the patient's labs from March 2023, IgE level within normal limits, CBC with differential within normal limits, Aspergillus IgE was normal, ANCA panel normal, allergen zone 8 panel was normal as well.  - PFT from 3/8/2023 shows moderate obstruction without restriction and a mildly reduced DLCO, FEV1 is 66%.    Assessment / Plan:   Diagnoses and all orders for this visit:    1. Severe persistent asthma without complication (Primary)  -Asthma is well controlled I reviewed her labs with her today.  Ultimately does not qualify for biologic at this time.  If she continues to have exacerbations requiring steroids that would qualify her for Dupixent and Tezspire.  Continue Symbicort, Singulair and albuterol for now.  Recommend regular exercise at least 30 minutes cardiovascular exercise per day.    2. Class 3 severe obesity due to excess calories with serious comorbidity and body mass index (BMI) of 40.0 to 44.9 in adult  -I congratulated the patient on the weight loss.  I recommend continue diet and exercise.      Follow Up:   Return in about 6 months (around 12/8/2023).       JACQUELYN Adams  DO Sarah  Pulmonary and Critical Care Medicine  Note Electronically Signed    Part of this note may be an electronic transcription/translation of spoken language to printed text using the Dragon Dictation System.

## 2024-02-23 DIAGNOSIS — J45.51 SEVERE PERSISTENT ASTHMA WITH ACUTE EXACERBATION: ICD-10-CM

## 2024-02-23 RX ORDER — MONTELUKAST SODIUM 10 MG/1
10 TABLET ORAL NIGHTLY
Qty: 90 TABLET | Refills: 0 | Status: SHIPPED | OUTPATIENT
Start: 2024-02-23

## 2024-05-19 DIAGNOSIS — J45.51 SEVERE PERSISTENT ASTHMA WITH ACUTE EXACERBATION: ICD-10-CM

## 2024-05-20 RX ORDER — MONTELUKAST SODIUM 10 MG/1
10 TABLET ORAL NIGHTLY
Qty: 90 TABLET | Refills: 0 | Status: SHIPPED | OUTPATIENT
Start: 2024-05-20

## 2024-08-14 DIAGNOSIS — J45.51 SEVERE PERSISTENT ASTHMA WITH ACUTE EXACERBATION: ICD-10-CM

## 2024-08-14 RX ORDER — MONTELUKAST SODIUM 10 MG/1
10 TABLET ORAL NIGHTLY
Qty: 90 TABLET | Refills: 0 | Status: SHIPPED | OUTPATIENT
Start: 2024-08-14

## 2025-06-17 ENCOUNTER — LAB (OUTPATIENT)
Dept: INTERNAL MEDICINE | Facility: CLINIC | Age: 65
End: 2025-06-17
Payer: MEDICARE

## 2025-06-17 ENCOUNTER — OFFICE VISIT (OUTPATIENT)
Dept: INTERNAL MEDICINE | Facility: CLINIC | Age: 65
End: 2025-06-17
Payer: MEDICARE

## 2025-06-17 ENCOUNTER — TELEPHONE (OUTPATIENT)
Dept: INTERNAL MEDICINE | Facility: CLINIC | Age: 65
End: 2025-06-17

## 2025-06-17 VITALS
TEMPERATURE: 96.8 F | RESPIRATION RATE: 16 BRPM | DIASTOLIC BLOOD PRESSURE: 84 MMHG | SYSTOLIC BLOOD PRESSURE: 124 MMHG | WEIGHT: 256.6 LBS | OXYGEN SATURATION: 100 % | BODY MASS INDEX: 50.38 KG/M2 | HEIGHT: 60 IN | HEART RATE: 76 BPM

## 2025-06-17 DIAGNOSIS — R60.0 BILATERAL LEG EDEMA: ICD-10-CM

## 2025-06-17 DIAGNOSIS — F31.9 BIPOLAR AFFECTIVE DISORDER, REMISSION STATUS UNSPECIFIED: ICD-10-CM

## 2025-06-17 DIAGNOSIS — K52.9 CHRONIC DIARRHEA: ICD-10-CM

## 2025-06-17 DIAGNOSIS — E78.2 MIXED HYPERLIPIDEMIA: ICD-10-CM

## 2025-06-17 DIAGNOSIS — N39.46 MIXED STRESS AND URGE URINARY INCONTINENCE: ICD-10-CM

## 2025-06-17 DIAGNOSIS — R25.1 TREMOR: ICD-10-CM

## 2025-06-17 DIAGNOSIS — Z91.89 AT RISK FOR SLEEP APNEA: ICD-10-CM

## 2025-06-17 DIAGNOSIS — Z13.1 DIABETES MELLITUS SCREENING: ICD-10-CM

## 2025-06-17 DIAGNOSIS — R06.83 SNORING: ICD-10-CM

## 2025-06-17 DIAGNOSIS — R06.83 SNORING: Primary | ICD-10-CM

## 2025-06-17 PROBLEM — F40.00 AGORAPHOBIA: Status: ACTIVE | Noted: 2025-06-17

## 2025-06-17 LAB
ALBUMIN SERPL-MCNC: 4.4 G/DL (ref 3.5–5.2)
ALBUMIN/GLOB SERPL: 1.6 G/DL
ALP SERPL-CCNC: 107 U/L (ref 39–117)
ALT SERPL W P-5'-P-CCNC: 24 U/L (ref 1–33)
ANION GAP SERPL CALCULATED.3IONS-SCNC: 13.7 MMOL/L (ref 5–15)
AST SERPL-CCNC: 21 U/L (ref 1–32)
BILIRUB SERPL-MCNC: 0.3 MG/DL (ref 0–1.2)
BUN SERPL-MCNC: 14 MG/DL (ref 8–23)
BUN/CREAT SERPL: 10.9 (ref 7–25)
CALCIUM SPEC-SCNC: 9.6 MG/DL (ref 8.6–10.5)
CHLORIDE SERPL-SCNC: 106 MMOL/L (ref 98–107)
CHOLEST SERPL-MCNC: 184 MG/DL (ref 0–200)
CO2 SERPL-SCNC: 23.3 MMOL/L (ref 22–29)
CREAT SERPL-MCNC: 1.28 MG/DL (ref 0.57–1)
DEPRECATED RDW RBC AUTO: 45.6 FL (ref 37–54)
EGFRCR SERPLBLD CKD-EPI 2021: 47.5 ML/MIN/1.73
ERYTHROCYTE [DISTWIDTH] IN BLOOD BY AUTOMATED COUNT: 13.4 % (ref 12.3–15.4)
GLOBULIN UR ELPH-MCNC: 2.8 GM/DL
GLUCOSE SERPL-MCNC: 100 MG/DL (ref 65–99)
HBA1C MFR BLD: 5.5 % (ref 4.8–5.6)
HCT VFR BLD AUTO: 37.1 % (ref 34–46.6)
HDLC SERPL-MCNC: 44 MG/DL (ref 40–60)
HGB BLD-MCNC: 12 G/DL (ref 12–15.9)
LDLC SERPL CALC-MCNC: 98 MG/DL (ref 0–100)
LDLC/HDLC SERPL: 2.04 {RATIO}
MCH RBC QN AUTO: 30.5 PG (ref 26.6–33)
MCHC RBC AUTO-ENTMCNC: 32.3 G/DL (ref 31.5–35.7)
MCV RBC AUTO: 94.2 FL (ref 79–97)
PLATELET # BLD AUTO: 141 10*3/MM3 (ref 140–450)
PMV BLD AUTO: 12.3 FL (ref 6–12)
POTASSIUM SERPL-SCNC: 4.1 MMOL/L (ref 3.5–5.2)
PROT SERPL-MCNC: 7.2 G/DL (ref 6–8.5)
RBC # BLD AUTO: 3.94 10*6/MM3 (ref 3.77–5.28)
SODIUM SERPL-SCNC: 143 MMOL/L (ref 136–145)
T4 FREE SERPL-MCNC: 0.95 NG/DL (ref 0.92–1.68)
TRIGL SERPL-MCNC: 251 MG/DL (ref 0–150)
TSH SERPL DL<=0.05 MIU/L-ACNC: 5.7 UIU/ML (ref 0.27–4.2)
VLDLC SERPL-MCNC: 42 MG/DL (ref 5–40)
WBC NRBC COR # BLD AUTO: 7.08 10*3/MM3 (ref 3.4–10.8)

## 2025-06-17 PROCEDURE — 81003 URINALYSIS AUTO W/O SCOPE: CPT | Performed by: NURSE PRACTITIONER

## 2025-06-17 PROCEDURE — 85027 COMPLETE CBC AUTOMATED: CPT | Performed by: NURSE PRACTITIONER

## 2025-06-17 PROCEDURE — 80053 COMPREHEN METABOLIC PANEL: CPT | Performed by: NURSE PRACTITIONER

## 2025-06-17 PROCEDURE — 36415 COLL VENOUS BLD VENIPUNCTURE: CPT | Performed by: NURSE PRACTITIONER

## 2025-06-17 PROCEDURE — 84443 ASSAY THYROID STIM HORMONE: CPT | Performed by: NURSE PRACTITIONER

## 2025-06-17 PROCEDURE — 80061 LIPID PANEL: CPT | Performed by: NURSE PRACTITIONER

## 2025-06-17 PROCEDURE — 84439 ASSAY OF FREE THYROXINE: CPT | Performed by: NURSE PRACTITIONER

## 2025-06-17 PROCEDURE — 83036 HEMOGLOBIN GLYCOSYLATED A1C: CPT | Performed by: NURSE PRACTITIONER

## 2025-06-17 RX ORDER — NALTREXONE HYDROCHLORIDE 50 MG/1
50 TABLET, FILM COATED ORAL DAILY
COMMUNITY

## 2025-06-17 RX ORDER — COLESTIPOL HYDROCHLORIDE 1 G/1
1-2 TABLET ORAL DAILY
COMMUNITY

## 2025-06-17 RX ORDER — ALPRAZOLAM 0.25 MG
0.25 TABLET ORAL DAILY PRN
COMMUNITY
Start: 2025-06-05

## 2025-06-17 NOTE — TELEPHONE ENCOUNTER
Caller: DENVER CARVAJAL    Relationship: Emergency Contact    Best call back number: [phone]     What is the best time to reach you: ANYTIME     Who are you requesting to speak with (clinical staff, provider,  specific staff member): CLINICAL STAFF    What was the call regarding: THE PATIENT'S  IS CALLING TO SEE IF THEY CAN GET AN ORDER FOR A STAR LIFT PLACED AND SENT TO [facility]     Is it okay if the provider responds through MyChart: YES
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
## 2025-06-18 ENCOUNTER — HOSPITAL ENCOUNTER (OUTPATIENT)
Dept: CARDIOLOGY | Facility: HOSPITAL | Age: 65
Discharge: HOME | End: 2025-06-18
Admitting: NURSE PRACTITIONER
Payer: MEDICARE

## 2025-06-18 ENCOUNTER — PATIENT ROUNDING (BHMG ONLY) (OUTPATIENT)
Dept: INTERNAL MEDICINE | Facility: CLINIC | Age: 65
End: 2025-06-18
Payer: MEDICARE

## 2025-06-18 DIAGNOSIS — R60.0 BILATERAL LEG EDEMA: ICD-10-CM

## 2025-06-18 LAB
BH CV LOWER VASCULAR LEFT COMMON FEMORAL COMPRESS: NORMAL
BH CV LOWER VASCULAR LEFT COMMON FEMORAL PHASIC: NORMAL
BH CV LOWER VASCULAR LEFT COMMON FEMORAL SPONT: NORMAL
BH CV LOWER VASCULAR LEFT DISTAL FEMORAL COMPRESS: NORMAL
BH CV LOWER VASCULAR LEFT DISTAL FEMORAL PHASIC: NORMAL
BH CV LOWER VASCULAR LEFT DISTAL FEMORAL SPONT: NORMAL
BH CV LOWER VASCULAR LEFT GASTRONEMIUS COMPRESS: NORMAL
BH CV LOWER VASCULAR LEFT GREATER SAPH AK COMPRESS: NORMAL
BH CV LOWER VASCULAR LEFT GREATER SAPH BK COMPRESS: NORMAL
BH CV LOWER VASCULAR LEFT LESSER SAPH COMPRESS: NORMAL
BH CV LOWER VASCULAR LEFT MID FEMORAL COMPRESS: NORMAL
BH CV LOWER VASCULAR LEFT MID FEMORAL PHASIC: NORMAL
BH CV LOWER VASCULAR LEFT MID FEMORAL SPONT: NORMAL
BH CV LOWER VASCULAR LEFT PERONEAL AUGMENT: NORMAL
BH CV LOWER VASCULAR LEFT PERONEAL COMPRESS: NORMAL
BH CV LOWER VASCULAR LEFT POPLITEAL COMPRESS: NORMAL
BH CV LOWER VASCULAR LEFT POPLITEAL PHASIC: NORMAL
BH CV LOWER VASCULAR LEFT POPLITEAL SPONT: NORMAL
BH CV LOWER VASCULAR LEFT POSTERIOR TIBIAL AUGMENT: NORMAL
BH CV LOWER VASCULAR LEFT POSTERIOR TIBIAL COMPRESS: NORMAL
BH CV LOWER VASCULAR LEFT PROFUNDA FEMORAL PHASIC: NORMAL
BH CV LOWER VASCULAR LEFT PROFUNDA FEMORAL SPONT: NORMAL
BH CV LOWER VASCULAR LEFT PROXIMAL FEMORAL COMPRESS: NORMAL
BH CV LOWER VASCULAR LEFT PROXIMAL FEMORAL PHASIC: NORMAL
BH CV LOWER VASCULAR LEFT PROXIMAL FEMORAL SPONT: NORMAL
BH CV LOWER VASCULAR LEFT SAPHENOFEMORAL JUNCTION COMPRESS: NORMAL
BH CV LOWER VASCULAR LEFT SAPHENOFEMORAL JUNCTION PHASIC: NORMAL
BH CV LOWER VASCULAR LEFT SAPHENOFEMORAL JUNCTION SPONT: NORMAL
BH CV LOWER VASCULAR RIGHT COMMON FEMORAL COMPRESS: NORMAL
BH CV LOWER VASCULAR RIGHT COMMON FEMORAL PHASIC: NORMAL
BH CV LOWER VASCULAR RIGHT COMMON FEMORAL SPONT: NORMAL
BH CV LOWER VASCULAR RIGHT DISTAL FEMORAL COMPRESS: NORMAL
BH CV LOWER VASCULAR RIGHT DISTAL FEMORAL PHASIC: NORMAL
BH CV LOWER VASCULAR RIGHT DISTAL FEMORAL SPONT: NORMAL
BH CV LOWER VASCULAR RIGHT GASTRONEMIUS COMPRESS: NORMAL
BH CV LOWER VASCULAR RIGHT GREATER SAPH AK COMPRESS: NORMAL
BH CV LOWER VASCULAR RIGHT GREATER SAPH BK COMPRESS: NORMAL
BH CV LOWER VASCULAR RIGHT LESSER SAPH COMPRESS: NORMAL
BH CV LOWER VASCULAR RIGHT MID FEMORAL COMPRESS: NORMAL
BH CV LOWER VASCULAR RIGHT MID FEMORAL PHASIC: NORMAL
BH CV LOWER VASCULAR RIGHT MID FEMORAL SPONT: NORMAL
BH CV LOWER VASCULAR RIGHT PERONEAL AUGMENT: NORMAL
BH CV LOWER VASCULAR RIGHT PERONEAL COMPRESS: NORMAL
BH CV LOWER VASCULAR RIGHT POPLITEAL COMPRESS: NORMAL
BH CV LOWER VASCULAR RIGHT POPLITEAL PHASIC: NORMAL
BH CV LOWER VASCULAR RIGHT POPLITEAL SPONT: NORMAL
BH CV LOWER VASCULAR RIGHT POSTERIOR TIBIAL AUGMENT: NORMAL
BH CV LOWER VASCULAR RIGHT POSTERIOR TIBIAL COMPRESS: NORMAL
BH CV LOWER VASCULAR RIGHT PROFUNDA FEMORAL PHASIC: NORMAL
BH CV LOWER VASCULAR RIGHT PROFUNDA FEMORAL SPONT: NORMAL
BH CV LOWER VASCULAR RIGHT PROXIMAL FEMORAL COMPRESS: NORMAL
BH CV LOWER VASCULAR RIGHT PROXIMAL FEMORAL PHASIC: NORMAL
BH CV LOWER VASCULAR RIGHT PROXIMAL FEMORAL SPONT: NORMAL
BH CV LOWER VASCULAR RIGHT SAPHENOFEMORAL JUNCTION COMPRESS: NORMAL
BH CV LOWER VASCULAR RIGHT SAPHENOFEMORAL JUNCTION PHASIC: NORMAL
BH CV LOWER VASCULAR RIGHT SAPHENOFEMORAL JUNCTION SPONT: NORMAL
BILIRUB UR QL STRIP: NEGATIVE
CLARITY UR: ABNORMAL
COLOR UR: YELLOW
GLUCOSE UR STRIP-MCNC: NEGATIVE MG/DL
HGB UR QL STRIP.AUTO: NEGATIVE
HOLD SPECIMEN: NORMAL
KETONES UR QL STRIP: NEGATIVE
LEUKOCYTE ESTERASE UR QL STRIP.AUTO: NEGATIVE
NITRITE UR QL STRIP: NEGATIVE
PH UR STRIP.AUTO: 6 [PH] (ref 5–8)
PROT UR QL STRIP: NEGATIVE
SP GR UR STRIP: 1.02 (ref 1–1.03)
UROBILINOGEN UR QL STRIP: ABNORMAL

## 2025-06-18 PROCEDURE — 93970 EXTREMITY STUDY: CPT
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
## 2025-06-18 NOTE — PROGRESS NOTES
My name is Anitha Moctezuma and I am a patient experience representative for [facility] on [facility].    I would like to officially welcome you to our practice.  If you do not mind I would like to ask you a few questions about your recent visit with us.  If you do not have the time to reply that is perfectly fine.      First, could you tell me what went well with your recent visit?     Secondly, we are always looking for ways to make our patients' experiences even better. Do you have any recommendations on ways we may improve?     Third, safety is a top priority therefore do you have any concerns with that while in our office?    Finally, overall were you satisfied with your first visit to our practice?     Thank you for taking the time to answer a few questions today.  In the coming days you will receive a survey.  We encourage you to complete the survey to let us know how we did!        Anitha

## 2025-06-19 ENCOUNTER — APPOINTMENT (OUTPATIENT)
Facility: HOSPITAL | Age: 65
End: 2025-06-19
Payer: COMMERCIAL

## 2025-06-19 ENCOUNTER — TELEPHONE (OUTPATIENT)
Dept: INTERNAL MEDICINE | Facility: CLINIC | Age: 65
End: 2025-06-19
Payer: COMMERCIAL

## 2025-06-19 ENCOUNTER — HOSPITAL ENCOUNTER (EMERGENCY)
Facility: HOSPITAL | Age: 65
Discharge: HOME | End: 2025-06-19
Attending: STUDENT IN AN ORGANIZED HEALTH CARE EDUCATION/TRAINING PROGRAM
Payer: COMMERCIAL

## 2025-06-19 VITALS
HEIGHT: 60 IN | WEIGHT: 242.51 LBS | DIASTOLIC BLOOD PRESSURE: 78 MMHG | SYSTOLIC BLOOD PRESSURE: 120 MMHG | BODY MASS INDEX: 47.61 KG/M2 | OXYGEN SATURATION: 100 % | TEMPERATURE: 98.2 F | RESPIRATION RATE: 16 BRPM | HEART RATE: 66 BPM

## 2025-06-19 DIAGNOSIS — S80.12XA CONTUSION OF LEFT LOWER EXTREMITY, INITIAL ENCOUNTER: Primary | ICD-10-CM

## 2025-06-19 PROCEDURE — 99283 EMERGENCY DEPT VISIT LOW MDM: CPT | Performed by: STUDENT IN AN ORGANIZED HEALTH CARE EDUCATION/TRAINING PROGRAM

## 2025-06-19 PROCEDURE — 73560 X-RAY EXAM OF KNEE 1 OR 2: CPT

## 2025-06-19 PROCEDURE — 73590 X-RAY EXAM OF LOWER LEG: CPT

## 2025-06-19 RX ORDER — LIDOCAINE 50 MG/G
1 PATCH TOPICAL EVERY 24 HOURS
Qty: 14 PATCH | Refills: 0 | Status: SHIPPED | OUTPATIENT
Start: 2025-06-19

## 2025-06-19 RX ORDER — HYDROCODONE BITARTRATE AND ACETAMINOPHEN 5; 325 MG/1; MG/1
1 TABLET ORAL ONCE
Refills: 0 | Status: COMPLETED | OUTPATIENT
Start: 2025-06-19 | End: 2025-06-19

## 2025-06-19 RX ADMIN — HYDROCODONE BITARTRATE AND ACETAMINOPHEN 1 TABLET: 5; 325 TABLET ORAL at 17:19

## 2025-06-19 NOTE — TELEPHONE ENCOUNTER
Caller: Katheryn Garcia    Relationship: Self    Best call back number: [phone]       Who are you requesting to speak with (clinical staff, provider,  specific staff member): RADHA CARRIZALES      What was the call regarding: PATIENT WANTS TO SPEAK TO RADHA CARRIZALES AND DID NOT SAY WHY    Is it okay if the provider responds through MyChart:

## 2025-06-19 NOTE — DISCHARGE INSTRUCTIONS
Follow-up appropriately with your primary care physician.  Use crutches as needed to ambulate.  Return to emergency room for worsening symptoms.
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
## 2025-06-19 NOTE — TELEPHONE ENCOUNTER
Spoke with patients  and he stated they are at [facility] ER currently getting checked out. Wanted to ask PCP if they should be seen at [facility] or [place]. Nothing further needed.

## 2025-06-20 ENCOUNTER — TELEPHONE (OUTPATIENT)
Dept: INTERNAL MEDICINE | Facility: CLINIC | Age: 65
End: 2025-06-20

## 2025-06-20 NOTE — TELEPHONE ENCOUNTER
I will need more info on this. We did not discuss anything at the visit that would warrant/qualify for a stair lift, so, unfortunately,  I will have  to see her before I can order this
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
## 2025-06-20 NOTE — PROGRESS NOTES
Subjective   Katheryn Garcia is a 64 y.o. female here to establish care.  Chief Complaint   Patient presents with    Establish Care    Edema     BLE, left is worse than right, ongoing for a couple months     Snoring     Psych wants her evaluated for sleep apnea     Tremors     Both hands, right foot     Diarrhea     Incontinent with urinary and bowels        History of Present Illness  History of Present Illness  The patient is a 64-year-old female who is here to establish care. She has a known history of asthma, anxiety, depression, bipolar disorder, essential tremor, higher body weight, and hyperlipidemia. She is on rosuvastatin for her hyperlipidemia. She sees Dr. Smith for her asthma management, which includes p.r.n. albuterol nebs, inhalers, routine Symbicort, and montelukast. She follows with Dr. Silvio Mcgowan for her anxiety and depression and is on fluoxetine, lamotrigine, olanzapine, topiramate, naltrexone, and p.r.n. Xanax.     Today, she is here to discuss swelling in her bilateral lower extremities, snoring, and a recommendation from her psychiatrist to be evaluated for sleep apnea. She also wants to address her tremors and has experienced urinary and fecal incontinence with loose stools.    She has been experiencing bilateral lower extremity swelling for several months, which is more pronounced on the left side. This is accompanied by discomfort and generalized swelling, but no localized tenderness or warmth. She reports no history of blood clots, cardiac disorders, or myocardial infarction. She also reports no unusual dyspnea associated with the swelling, although she does experience shortness of breath due to her asthma. She has noticed brownish discoloration on her foot for over a week. She has not been screened for diabetes and reports excessive thirst. She sleeps in a recliner and does not experience orthopnea.  She reports no chest pain or palpitations.      She tells me that she has been  diagnosed with bipolar disorder, although she is uncertain whether it is type 1 or 2. She experiences both manic and depressive episodes. Her last manic episode was triggered by prednisone, leading to difficulty in returning to a normal state. She takes Zyprexa to manage these episodes and continues its use. She also experiences agoraphobia, which has improved to the point where she can attend doctor appointments. She was previously receiving home healthcare, but this service was discontinued by that provider. She is uncomfortable with telehealth consultations and prefers in-person visits. Her psychiatrist has recommended that she establish care with a primary care physician.    She has been experiencing tremors for several years, which have progressively worsened. Her psychiatrist has attempted various treatments, including propranolol, which was ineffective and is currently being tapered off. A referral to neurology has been initiated.    She does not feel refreshed upon waking and often feels the need to nap during the day.    She recently underwent a colonoscopy, which did not reveal any abnormalities such as polyps, colitis, Crohn's disease, or ulcerative colitis. She has always experienced frequent urination, but urinary incontinence has worsened over the past 1 to 2 years. Bowel incontinence has been severe for approximately 6 months. She uses pull-ups for protection. She reports no abdominal pain, nausea, vomiting, cramping, or bloating. She takes colestipol and Imodium, which help maintain constipation and reduce leakage. She does not currently see a gastroenterologist. Urinary incontinence occurs without coughing or sneezing. She has not undergone pelvic floor physical therapy and reports no urinary urgency, frequency, or dysuria. She has not taken any medications for overactive bladder.    She frequently falls due to balance issues and has sustained head injuries, although none required emergency room  visits. She recently fell at her ophthalmologist's office due to losing balance on a rolling chair.    She experiences coughing and choking while eating or drinking, particularly with solids. She has not undergone an evaluation for swallowing difficulties or an EGD.    FAMILY HISTORY  She reports no family history of blood clots.         The following portions of the patient's history were reviewed and updated as appropriate: allergies, current medications, past family history, past medical history, past social history, past surgical history and problem list.          Allergies   Allergen Reactions    Prednisone Delirium     PT STATED STEROID PSYCHOSIS    Codeine Itching     Past Medical History:   Diagnosis Date    Allergic     Anxiety     Asthma     Depression     Migraine     Mood disorder      Past Surgical History:   Procedure Laterality Date    CHOLECYSTECTOMY  1993    HAND TENDON REPAIR Right 2014    HYSTERECTOMY  1995    SPINE SURGERY  08/2024    C5-6 ACDF WITH NEUROMONITORING ([facility])     Family History   Problem Relation Age of Onset    Hyperlipidemia Mother     Cancer Mother         skin    Arthritis Mother     Migraine headaches Mother     Dementia Mother     Stroke Father     Hypertension Father     Cancer Father         skin     Social History     Socioeconomic History    Marital status:    Tobacco Use    Smoking status: Never     Passive exposure: Never    Smokeless tobacco: Never   Vaping Use    Vaping status: Never Used   Substance and Sexual Activity    Alcohol use: Never    Drug use: Never    Sexual activity: Defer     Immunization History   Administered Date(s) Administered    COVID-19 (PFIZER) Purple Cap Monovalent 03/20/2021, 04/13/2021    Covid-19 (Pfizer) Gray Cap Monovalent 02/07/2022    Flublok 18+yrs 11/05/2022    Fluzone  >6mos 10/15/2010    Fluzone (or Fluarix & Flulaval for VFC) >6mos 12/26/2023    Pneumococcal Conjugate 20-Valent (PCV20) 05/26/2023    Shingrix 05/26/2023,  "08/01/2023       Current Outpatient Medications:     albuterol (PROVENTIL) (2.5 MG/3ML) 0.083% nebulizer solution, Take 2.5 mg by nebulization 4 (Four) Times a Day As Needed for Wheezing., Disp: 360 mL, Rfl: 11    albuterol sulfate  (90 Base) MCG/ACT inhaler, Inhale 2 puffs Every 4 (Four) Hours As Needed for Wheezing., Disp: 18 g, Rfl: 11    ALPRAZolam (XANAX) 0.25 MG tablet, Take 1 tablet by mouth Daily As Needed., Disp: , Rfl:     budesonide-formoterol (Symbicort) 160-4.5 MCG/ACT inhaler, Inhale 2 puffs 2 (Two) Times a Day., Disp: 3 each, Rfl: 5    cetirizine (zyrTEC) 10 MG tablet, Take 1 tablet by mouth Daily., Disp: , Rfl:     colestipol (COLESTID) 1 g tablet, Take 1-2 tablets by mouth Daily., Disp: , Rfl:     FLUoxetine (PROzac) 40 MG capsule, Take 2 capsules by mouth 2 (Two) Times a Day., Disp: , Rfl:     fluticasone (FLONASE) 50 MCG/ACT nasal spray, 1 spray by Each Nare route Daily., Disp: , Rfl:     lamoTRIgine (LaMICtal) 100 MG tablet, Take 1 tablet by mouth Daily., Disp: , Rfl:     montelukast (SINGULAIR) 10 MG tablet, TAKE 1 TABLET BY MOUTH ONCE DAILY AT NIGHT, Disp: 90 tablet, Rfl: 0    naltrexone (DEPADE) 50 MG tablet, Take 1 tablet by mouth Daily., Disp: , Rfl:     OLANZapine (zyPREXA) 20 MG tablet, Take 1 tablet by mouth Daily., Disp: , Rfl:     rosuvastatin (CRESTOR) 10 MG tablet, Take 1 tablet by mouth Every Night., Disp: , Rfl:     topiramate (TOPAMAX) 100 MG tablet, Take 1 tablet by mouth Every 12 (Twelve) Hours., Disp: , Rfl:     lidocaine (LIDODERM) 5 %, Place 1 patch on the skin as directed by provider Daily. Remove & Discard patch within 12 hours or as directed by MD, Disp: 14 patch, Rfl: 0  No current facility-administered medications for this visit.    Objective   Blood pressure 124/84, pulse 76, temperature 96.8 °F (36 °C), temperature source Infrared, resp. rate 16, height 152.4 cm (60\"), weight 116 kg (256 lb 9.6 oz), SpO2 100%.      Physical Exam  Vitals and nursing note reviewed. "   Constitutional:       General: She is not in acute distress.     Appearance: Normal appearance. She is well-developed and well-groomed. She is morbidly obese. She is not ill-appearing, toxic-appearing or diaphoretic.   HENT:      Head: Normocephalic and atraumatic.      Mouth/Throat:      Lips: Pink. No lesions.      Pharynx: Oropharynx is clear.   Eyes:      General: Lids are normal. Gaze aligned appropriately. No scleral icterus.     Conjunctiva/sclera: Conjunctivae normal.   Neck:      Thyroid: No thyroid mass, thyromegaly or thyroid tenderness.      Vascular: No carotid bruit or JVD.   Cardiovascular:      Rate and Rhythm: Normal rate and regular rhythm.      Chest Wall: PMI is not displaced. No thrill.      Heart sounds: Normal heart sounds. No murmur heard.  Pulmonary:      Effort: Pulmonary effort is normal. No accessory muscle usage or respiratory distress.      Breath sounds: Normal breath sounds.   Chest:      Chest wall: No tenderness.   Abdominal:      General: Abdomen is protuberant. Bowel sounds are normal. There is no distension.      Palpations: Abdomen is soft.      Tenderness: There is no abdominal tenderness. There is no guarding or rebound. Negative signs include Rovsing's sign.      Hernia: No hernia is present.   Musculoskeletal:         General: Normal range of motion.      Cervical back: Normal range of motion. Normal range of motion.      Right lower le+ Edema present.      Left lower le+ Edema present.   Lymphadenopathy:      Cervical: No cervical adenopathy.   Skin:     General: Skin is warm and dry.      Coloration: Skin is not ashen, jaundiced, mottled or pale.      Findings: No erythema.   Neurological:      General: No focal deficit present.      Mental Status: She is alert and oriented to person, place, and time.      GCS: GCS eye subscore is 4. GCS verbal subscore is 5. GCS motor subscore is 6.      Motor: Tremor (at rest with BUE and BLE) present. No atrophy.       Coordination: Coordination is intact.      Gait: Gait abnormal (amb with assistive device.).   Psychiatric:         Attention and Perception: Attention normal.         Mood and Affect: Mood and affect normal.         Speech: Speech normal.         Behavior: Behavior normal. Behavior is cooperative.         Thought Content: Thought content normal.         Cognition and Memory: Cognition normal.         Judgment: Judgment normal.           Assessment & Plan   Diagnoses and all orders for this visit:    1. Snoring (Primary)  -     Ambulatory Referral to Sleep Medicine  -     CBC (No Diff); Future  -     Comprehensive Metabolic Panel; Future  -     Lipid Panel; Future  -     Hemoglobin A1c; Future  -     TSH Rfx On Abnormal To Free T4; Future    2. At risk for sleep apnea  -     Ambulatory Referral to Sleep Medicine  -     CBC (No Diff); Future  -     Comprehensive Metabolic Panel; Future  -     Lipid Panel; Future  -     Hemoglobin A1c; Future  -     TSH Rfx On Abnormal To Free T4; Future    3. Bipolar affective disorder, remission status unspecified  -     CBC (No Diff); Future  -     Comprehensive Metabolic Panel; Future  -     Lipid Panel; Future  -     Hemoglobin A1c; Future  -     TSH Rfx On Abnormal To Free T4; Future    4. Tremor  -     CBC (No Diff); Future  -     Comprehensive Metabolic Panel; Future  -     Lipid Panel; Future  -     Hemoglobin A1c; Future  -     TSH Rfx On Abnormal To Free T4; Future  -     Cancel: Ambulatory Referral to Neurology  -     Ambulatory Referral to Neurology    5. Bilateral leg edema  -     CBC (No Diff); Future  -     Comprehensive Metabolic Panel; Future  -     Lipid Panel; Future  -     Hemoglobin A1c; Future  -     TSH Rfx On Abnormal To Free T4; Future  -     Cancel: Duplex Venous Lower Extremity - Bilateral CAR; Future  -     Duplex Venous Lower Extremity - Bilateral CAR; Future    6. Chronic diarrhea  -     Cancel: Ambulatory Referral to Gastroenterology  -     Ambulatory  Referral to Gastroenterology    7. Mixed stress and urge urinary incontinence  -     Urinalysis With Culture If Indicated -; Future    8. Diabetes mellitus screening  -     Hemoglobin A1c; Future    9. Mixed hyperlipidemia  -     Lipid Panel; Future      Assessment & Plan  1. Bilateral lower extremity edema.  - The etiology of the edema remains uncertain at this time.  - Increased discomfort and tenderness in the left leg.  - An ultrasound of the legs will be ordered to exclude the possibility of a DVT.  - If the ultrasound results are negative for a thrombus, further investigations will be conducted to ascertain the cause of the edema.    2. Bipolar disorder.  - The patient reports stability in her condition.  - No recent changes in medication.  - She will continue her current medication regimen  with her psychiatrist and maintain regular appointments with her psychiatrist.    3. Tremor.  - The tremor does not appear to be consistent with an essential tremor.  - Gradual worsening of the tremor over the past couple of years.  - A referral to neurology will be made for further evaluation of the tremor.(Dr. Brown per pt request)  - Previous trials of propranolol were ineffective.    4. Suspected sleep apnea.  - Reports of snoring and unrefreshing sleep.  - Frequent need for naps during the day.  - A referral to sleep medicine will be made for further evaluation of potential sleep apnea.    5. Urinary incontinence.  - Long-standing urinary frequency with recent worsening of incontinence.  - No associated abdominal pain, nausea, vomiting, cramping, or bloating.  - A urinalysis will be conducted to evaluate the urinary incontinence and frequency.  - Consideration of medications for overactive bladder after full workup.  - declined pelvic floor PT     6. Fecal incontinence.  - Chronic diarrhea and fecal incontinence worsening over the past 6 months.  - No significant findings on recent colonoscopy.  - A referral to  gastroenterology will be made for further evaluation of the chronic diarrhea and fecal incontinence.  - Current management includes colestipol and Imodium but still ineffective      7. Dysphagia.  - Dysphagia appears to be more pronounced with solids than liquids.  - No prior evaluation for swallowing difficulties.  - A referral to gastroenterology will be made for further evaluation of the dysphagia, with consideration for EGD and possible esophageal dilation.  -secondary consideration to MBS    8. Hyperlipidemia.  - No recent lab work conducted.  - Routine blood work will be conducted, including a CBC, CMP, cholesterol check, diabetes screening, and thyroid screening.  - The patient has recently started a healthier diet and lost 7 pounds.    Follow-up  The patient will follow up in 3 months or sooner if necessary.           Return in about 3 months (around 9/17/2025) for chronic condition follow up, and need to collect labs today.  Plan of care discussed with pt. They verbalized understanding and agreement.     Patient or patient representative verbalized consent for the use of Ambient Listening during the visit with  EMILY Ivan for chart documentation. 6/20/2025  16:04 EDT

## 2025-06-23 ENCOUNTER — OFFICE VISIT (OUTPATIENT)
Dept: INTERNAL MEDICINE | Facility: CLINIC | Age: 65
End: 2025-06-23
Payer: MEDICARE

## 2025-06-23 ENCOUNTER — TELEPHONE (OUTPATIENT)
Dept: INTERNAL MEDICINE | Facility: CLINIC | Age: 65
End: 2025-06-23

## 2025-06-23 VITALS
WEIGHT: 242 LBS | TEMPERATURE: 96.8 F | OXYGEN SATURATION: 97 % | HEIGHT: 60 IN | SYSTOLIC BLOOD PRESSURE: 138 MMHG | RESPIRATION RATE: 18 BRPM | HEART RATE: 69 BPM | DIASTOLIC BLOOD PRESSURE: 70 MMHG | BODY MASS INDEX: 47.51 KG/M2

## 2025-06-23 DIAGNOSIS — R26.2 DIFFICULTY WALKING: ICD-10-CM

## 2025-06-23 DIAGNOSIS — S80.812A ABRASION OF LEFT LOWER EXTREMITY, INITIAL ENCOUNTER: Primary | ICD-10-CM

## 2025-06-23 DIAGNOSIS — L03.116 CELLULITIS OF LEFT LOWER EXTREMITY: ICD-10-CM

## 2025-06-23 DIAGNOSIS — R26.2 DIFFICULTY WALKING UP STAIRS: ICD-10-CM

## 2025-06-23 DIAGNOSIS — R06.09 DOE (DYSPNEA ON EXERTION): ICD-10-CM

## 2025-06-23 PROCEDURE — 99214 OFFICE O/P EST MOD 30 MIN: CPT | Performed by: FAMILY MEDICINE

## 2025-06-23 PROCEDURE — 1160F RVW MEDS BY RX/DR IN RCRD: CPT | Performed by: FAMILY MEDICINE

## 2025-06-23 PROCEDURE — 90715 TDAP VACCINE 7 YRS/> IM: CPT | Performed by: FAMILY MEDICINE

## 2025-06-23 PROCEDURE — 1159F MED LIST DOCD IN RCRD: CPT | Performed by: FAMILY MEDICINE

## 2025-06-23 PROCEDURE — 90471 IMMUNIZATION ADMIN: CPT | Performed by: FAMILY MEDICINE

## 2025-06-23 RX ORDER — DOXYCYCLINE 100 MG/1
100 CAPSULE ORAL 2 TIMES DAILY
Qty: 20 CAPSULE | Refills: 0 | Status: SHIPPED | OUTPATIENT
Start: 2025-06-23
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
## 2025-06-23 NOTE — LETTER
[facility]  Vaccine Consent Form    Patient Name:  Katheryn Garcia  Patient :  1960     Vaccine(s) Ordered    Tdap Vaccine => 6yo IM (BOOSTRIX/ADACEL)        Screening Checklist  The following questions should be completed prior to vaccination. If you answer “yes” to any question, it does not necessarily mean you should not be vaccinated. It just means we may need to clarify or ask more questions. If a question is unclear, please ask your healthcare provider to explain it.    Yes No   Any fever or moderate to severe illness today (mild illness and/or antibiotic treatment are not contraindications)?     Do you have a history of a serious reaction to any previous vaccinations, such as anaphylaxis, encephalopathy within 7 days, Guillain-Sultana syndrome within 6 weeks, seizure?     Have you received any live vaccine(s) (e.g MMR, REN) or any other vaccines in the last month (to ensure duplicate doses aren't given)?     Do you have an anaphylactic allergy to latex (DTaP, DTaP-IPV, Hep A, Hep B, MenB, RV, Td, Tdap), baker’s yeast (Hep B, HPV), polysorbates (RSV, nirsevimab, PCV 20 and 21, Rotavirrus, Tdap, Shingrix), or gelatin (REN, MMR)?     Do you have an anaphylactic allergy to neomycin (Rabies, REN, MMR, IPV, Hep A), polymyxin B (IPV), or streptomycin (IPV)?      Any cancer, leukemia, AIDS, or other immune system disorder? (REN, MMR, RV)     Do you have a parent, brother, or sister with an immune system problem (if immune competence of vaccine recipient clinically verified, can proceed)? (MMR, REN)     Any recent steroid treatments for >2 weeks, chemotherapy, or radiation treatment? (REN, MMR)     Have you received antibody-containing blood transfusions or IVIG in the past 11 months (recommended interval is dependent on product)? (MMR, REN)     Have you taken antiviral drugs (acyclovir, famciclovir, valacyclovir for REN) in the last 24 or 48 hours, respectively?      Are you pregnant or planning to become  "pregnant within 1 month? (ERN, MMR, HPV, IPV, MenB, Abrexvy; For Hep B- refer to Engerix-B; For RSV - Abrysvo is indicated for 32-36 weeks of pregnancy from September to January)     For infants, have you ever been told your child has had intussusception or a medical emergency involving obstruction of the intestine (Rotavirus)? If not for an infant, can skip this question.         *Ordering Physicians/APC should be consulted if \"yes\" is checked by the patient or guardian above.  I have received, read, and understand the Vaccine Information Statement (VIS) for each vaccine ordered.  I have considered my or my child's health status as well as the health status of my close contacts.  I have taken the opportunity to discuss my vaccine questions with my or my child's health care provider.   I have requested that the ordered vaccine(s) be given to me or my child.  I understand the benefits and risks of the vaccines.  I understand that I should remain in the clinic for 15 minutes after receiving the vaccine(s).  _________________________________________________________  Signature of Patient or Parent/Legal Guardian ____________________  Date     "

## 2025-06-25 ENCOUNTER — TELEPHONE (OUTPATIENT)
Dept: INTERNAL MEDICINE | Facility: CLINIC | Age: 65
End: 2025-06-25
Payer: COMMERCIAL

## 2025-06-25 NOTE — TELEPHONE ENCOUNTER
Patient called and is asking for a prescription for a stair lift.  She stated that it doesn't have anything to do with Insurance, that insurance does not pay.  She said that the stair lift will cost about $7,000 and it she has a prescription then she will not have to pay sales tax.  It will save her about $400.00

## 2025-06-25 NOTE — TELEPHONE ENCOUNTER
Caller: DENVER CARVAJAL    Relationship to patient: Emergency Contact    Best call back number: [phone]     Patient is needing: A LETTER STATING THAT PATIENT HAS SIGNIFICANT NEED FOR A CHAIR THAT HELPS PATIENT UP THE STAIRS. PLEASE FAX TO [facility]: [phone]

## 2025-06-27 ENCOUNTER — TELEPHONE (OUTPATIENT)
Dept: INTERNAL MEDICINE | Facility: CLINIC | Age: 65
End: 2025-06-27
Payer: COMMERCIAL

## 2025-06-27 NOTE — TELEPHONE ENCOUNTER
Provider: RADHA CARRIZALES    Caller: Katheryn Garcia    Relationship to Patient: Self    Phone Number: [phone]     Reason for Call: THE PATIENT'S  IS GOING TO  THE PREVIOUSLY REQUESTED LETTER FOR THE STAIR LIFT ON 06/27/25. THE PATIENT CANNOT ACCESS IT IN HER MYCHART.

## 2025-06-29 PROBLEM — R79.89 ELEVATED TSH: Status: ACTIVE | Noted: 2025-06-29
